# Patient Record
Sex: MALE | Race: BLACK OR AFRICAN AMERICAN | URBAN - METROPOLITAN AREA
[De-identification: names, ages, dates, MRNs, and addresses within clinical notes are randomized per-mention and may not be internally consistent; named-entity substitution may affect disease eponyms.]

---

## 2022-03-18 PROBLEM — E11.9 TYPE 2 DIABETES MELLITUS WITHOUT COMPLICATION, WITHOUT LONG-TERM CURRENT USE OF INSULIN (HCC): Status: ACTIVE | Noted: 2017-08-21

## 2023-09-25 ENCOUNTER — CLINICAL DOCUMENTATION (OUTPATIENT)
Dept: CARDIOTHORACIC SURGERY | Age: 73
End: 2023-09-25

## 2023-09-25 NOTE — PROGRESS NOTES
Emailed a request for CD's of heart cath, CALDERON, TTE, etc.. to [facility] in [place], [place]

## 2023-10-12 ENCOUNTER — TELEPHONE (OUTPATIENT)
Age: 73
End: 2023-10-12

## 2023-10-12 ENCOUNTER — CLINICAL DOCUMENTATION (OUTPATIENT)
Dept: CARDIOTHORACIC SURGERY | Age: 73
End: 2023-10-12

## 2023-10-12 NOTE — TELEPHONE ENCOUNTER
Melissa form the [address] [place] [address] called regarding pt not receiving an appt with a doctor. [facility] states [facility] on 9/21/23 and fax number was confirmed.   [facility] Authorization:   [id number]

## 2023-10-12 NOTE — PROGRESS NOTES
I am still waiting on CD's from the [place]. Spoke to wife and she stated he is not feeling well. I will try to get in [facility]. I instructed wife to take patient to the ER should he get worse. She agreed and understood. She said he was being very stubborn about going to the ER.

## 2023-10-17 ENCOUNTER — TELEPHONE (OUTPATIENT)
Age: 73
End: 2023-10-17

## 2023-10-31 ENCOUNTER — INITIAL CONSULT (OUTPATIENT)
Age: 73
End: 2023-10-31
Payer: OTHER GOVERNMENT

## 2023-10-31 VITALS
SYSTOLIC BLOOD PRESSURE: 110 MMHG | BODY MASS INDEX: 31.6 KG/M2 | WEIGHT: 225.7 LBS | HEART RATE: 93 BPM | HEIGHT: 71 IN | DIASTOLIC BLOOD PRESSURE: 66 MMHG

## 2023-10-31 DIAGNOSIS — I10 HYPERTENSION, UNSPECIFIED TYPE: ICD-10-CM

## 2023-10-31 DIAGNOSIS — Z95.3 HISTORY OF MITRAL VALVE REPLACEMENT WITH BIOPROSTHETIC VALVE: ICD-10-CM

## 2023-10-31 DIAGNOSIS — E78.5 HYPERLIPIDEMIA, UNSPECIFIED HYPERLIPIDEMIA TYPE: ICD-10-CM

## 2023-10-31 DIAGNOSIS — Z86.79 HISTORY OF ATRIAL FIBRILLATION: ICD-10-CM

## 2023-10-31 DIAGNOSIS — I50.32 CHRONIC HEART FAILURE WITH PRESERVED EJECTION FRACTION (HFPEF) (HCC): ICD-10-CM

## 2023-10-31 DIAGNOSIS — I25.10 CAD IN NATIVE ARTERY: Primary | ICD-10-CM

## 2023-10-31 DIAGNOSIS — Z98.890 HISTORY OF TRICUSPID VALVE REPAIR: ICD-10-CM

## 2023-10-31 DIAGNOSIS — R06.09 DOE (DYSPNEA ON EXERTION): ICD-10-CM

## 2023-10-31 PROBLEM — R06.00 DYSPNEA: Status: ACTIVE | Noted: 2023-10-31

## 2023-10-31 PROBLEM — T82.857A STENOSIS OF PROSTHETIC MITRAL VALVE: Status: ACTIVE | Noted: 2023-10-31

## 2023-10-31 PROCEDURE — 99204 OFFICE O/P NEW MOD 45 MIN: CPT | Performed by: INTERNAL MEDICINE

## 2023-10-31 PROCEDURE — 1123F ACP DISCUSS/DSCN MKR DOCD: CPT | Performed by: INTERNAL MEDICINE

## 2023-10-31 PROCEDURE — 3074F SYST BP LT 130 MM HG: CPT | Performed by: INTERNAL MEDICINE

## 2023-10-31 PROCEDURE — 93000 ELECTROCARDIOGRAM COMPLETE: CPT | Performed by: INTERNAL MEDICINE

## 2023-10-31 PROCEDURE — 3078F DIAST BP <80 MM HG: CPT | Performed by: INTERNAL MEDICINE

## 2023-10-31 RX ORDER — ATORVASTATIN CALCIUM 40 MG/1
40 TABLET, FILM COATED ORAL DAILY
COMMUNITY

## 2023-10-31 RX ORDER — METOPROLOL SUCCINATE 100 MG/1
200 TABLET, EXTENDED RELEASE ORAL DAILY
COMMUNITY

## 2023-10-31 RX ORDER — AMLODIPINE BESYLATE 10 MG/1
10 TABLET ORAL DAILY
COMMUNITY

## 2023-10-31 RX ORDER — FUROSEMIDE 20 MG/1
20 TABLET ORAL 2 TIMES DAILY
COMMUNITY

## 2023-10-31 RX ORDER — LOSARTAN POTASSIUM 100 MG/1
100 TABLET ORAL DAILY
COMMUNITY

## 2023-10-31 RX ORDER — ASPIRIN 325 MG
325 TABLET ORAL 2 TIMES DAILY
COMMUNITY
End: 2023-10-31 | Stop reason: ALTCHOICE

## 2023-11-06 ENCOUNTER — HOSPITAL ENCOUNTER (OUTPATIENT)
Dept: CARDIAC CATH/INVASIVE PROCEDURES | Age: 73
Discharge: HOME | End: 2023-11-06
Attending: INTERNAL MEDICINE | Admitting: INTERNAL MEDICINE
Payer: OTHER GOVERNMENT

## 2023-11-06 VITALS
WEIGHT: 225 LBS | DIASTOLIC BLOOD PRESSURE: 81 MMHG | RESPIRATION RATE: 16 BRPM | BODY MASS INDEX: 31.5 KG/M2 | OXYGEN SATURATION: 91 % | TEMPERATURE: 97.9 F | SYSTOLIC BLOOD PRESSURE: 119 MMHG | HEART RATE: 92 BPM | HEIGHT: 71 IN

## 2023-11-06 DIAGNOSIS — Z95.3 HISTORY OF MITRAL VALVE REPLACEMENT WITH BIOPROSTHETIC VALVE: ICD-10-CM

## 2023-11-06 DIAGNOSIS — I34.2 NONRHEUMATIC MITRAL VALVE STENOSIS: Primary | ICD-10-CM

## 2023-11-06 DIAGNOSIS — R94.31 ABNORMAL ECG: ICD-10-CM

## 2023-11-06 LAB
ANION GAP SERPL CALC-SCNC: 8 MMOL/L (ref 2–11)
BASOPHILS # BLD: 0.1 K/UL (ref 0–0.2)
BASOPHILS NFR BLD: 1 % (ref 0–2)
BUN SERPL-MCNC: 20 MG/DL (ref 8–23)
CALCIUM SERPL-MCNC: 9.3 MG/DL (ref 8.3–10.4)
CHLORIDE SERPL-SCNC: 110 MMOL/L (ref 101–110)
CO2 SERPL-SCNC: 22 MMOL/L (ref 21–32)
CREAT SERPL-MCNC: 1.3 MG/DL (ref 0.8–1.5)
DIFFERENTIAL METHOD BLD: ABNORMAL
ECHO BSA: 2.26 M2
EKG ATRIAL RATE: 91 BPM
EKG DIAGNOSIS: NORMAL
EKG P AXIS: 79 DEGREES
EKG P-R INTERVAL: 174 MS
EKG Q-T INTERVAL: 338 MS
EKG QRS DURATION: 82 MS
EKG QTC CALCULATION (BAZETT): 415 MS
EKG R AXIS: 33 DEGREES
EKG T AXIS: -55 DEGREES
EKG VENTRICULAR RATE: 91 BPM
EOSINOPHIL # BLD: 0.3 K/UL (ref 0–0.8)
EOSINOPHIL NFR BLD: 3 % (ref 0.5–7.8)
ERYTHROCYTE [DISTWIDTH] IN BLOOD BY AUTOMATED COUNT: 14.8 % (ref 11.9–14.6)
GLUCOSE SERPL-MCNC: 130 MG/DL (ref 65–100)
HCT VFR BLD AUTO: 40.2 % (ref 41.1–50.3)
HGB BLD-MCNC: 12.7 G/DL (ref 13.6–17.2)
IMM GRANULOCYTES # BLD AUTO: 0.1 K/UL (ref 0–0.5)
IMM GRANULOCYTES NFR BLD AUTO: 1 % (ref 0–5)
LYMPHOCYTES # BLD: 1.5 K/UL (ref 0.5–4.6)
LYMPHOCYTES NFR BLD: 18 % (ref 13–44)
MCH RBC QN AUTO: 29.1 PG (ref 26.1–32.9)
MCHC RBC AUTO-ENTMCNC: 31.6 G/DL (ref 31.4–35)
MCV RBC AUTO: 92.2 FL (ref 82–102)
MONOCYTES # BLD: 1.1 K/UL (ref 0.1–1.3)
MONOCYTES NFR BLD: 13 % (ref 4–12)
NEUTS SEG # BLD: 5.6 K/UL (ref 1.7–8.2)
NEUTS SEG NFR BLD: 64 % (ref 43–78)
NRBC # BLD: 0 K/UL (ref 0–0.2)
PLATELET # BLD AUTO: 244 K/UL (ref 150–450)
PMV BLD AUTO: 9.9 FL (ref 9.4–12.3)
POTASSIUM SERPL-SCNC: 4.1 MMOL/L (ref 3.5–5.1)
RBC # BLD AUTO: 4.36 M/UL (ref 4.23–5.6)
SODIUM SERPL-SCNC: 140 MMOL/L (ref 133–143)
WBC # BLD AUTO: 8.6 K/UL (ref 4.3–11.1)

## 2023-11-06 PROCEDURE — C1751 CATH, INF, PER/CENT/MIDLINE: HCPCS | Performed by: INTERNAL MEDICINE

## 2023-11-06 PROCEDURE — C1769 GUIDE WIRE: HCPCS | Performed by: INTERNAL MEDICINE

## 2023-11-06 PROCEDURE — 2709999900 HC NON-CHARGEABLE SUPPLY: Performed by: INTERNAL MEDICINE

## 2023-11-06 PROCEDURE — 6370000000 HC RX 637 (ALT 250 FOR IP): Performed by: INTERNAL MEDICINE

## 2023-11-06 PROCEDURE — 99152 MOD SED SAME PHYS/QHP 5/>YRS: CPT | Performed by: INTERNAL MEDICINE

## 2023-11-06 PROCEDURE — 93460 R&L HRT ART/VENTRICLE ANGIO: CPT | Performed by: INTERNAL MEDICINE

## 2023-11-06 PROCEDURE — 2500000003 HC RX 250 WO HCPCS: Performed by: INTERNAL MEDICINE

## 2023-11-06 PROCEDURE — 6360000002 HC RX W HCPCS: Performed by: INTERNAL MEDICINE

## 2023-11-06 PROCEDURE — 99152 MOD SED SAME PHYS/QHP 5/>YRS: CPT

## 2023-11-06 PROCEDURE — 85025 COMPLETE CBC W/AUTO DIFF WBC: CPT

## 2023-11-06 PROCEDURE — 80048 BASIC METABOLIC PNL TOTAL CA: CPT

## 2023-11-06 PROCEDURE — C1894 INTRO/SHEATH, NON-LASER: HCPCS | Performed by: INTERNAL MEDICINE

## 2023-11-06 PROCEDURE — 93320 DOPPLER ECHO COMPLETE: CPT

## 2023-11-06 PROCEDURE — 6360000004 HC RX CONTRAST MEDICATION: Performed by: INTERNAL MEDICINE

## 2023-11-06 RX ORDER — HEPARIN SODIUM 200 [USP'U]/100ML
INJECTION, SOLUTION INTRAVENOUS CONTINUOUS PRN
Status: DISCONTINUED | OUTPATIENT
Start: 2023-11-06 | End: 2023-11-06 | Stop reason: HOSPADM

## 2023-11-06 RX ORDER — SODIUM CHLORIDE 9 MG/ML
INJECTION, SOLUTION INTRAVENOUS CONTINUOUS
Status: DISCONTINUED | OUTPATIENT
Start: 2023-11-06 | End: 2023-11-06 | Stop reason: HOSPADM

## 2023-11-06 RX ORDER — LIDOCAINE HYDROCHLORIDE 20 MG/ML
SOLUTION OROPHARYNGEAL PRN
Status: COMPLETED | OUTPATIENT
Start: 2023-11-06 | End: 2023-11-06

## 2023-11-06 RX ORDER — ASPIRIN 81 MG/1
324 TABLET, CHEWABLE ORAL ONCE
Status: CANCELLED | OUTPATIENT
Start: 2023-11-06 | End: 2023-11-06

## 2023-11-06 RX ORDER — MIDAZOLAM HYDROCHLORIDE 1 MG/ML
INJECTION INTRAMUSCULAR; INTRAVENOUS PRN
Status: COMPLETED | OUTPATIENT
Start: 2023-11-06 | End: 2023-11-06

## 2023-11-06 RX ORDER — LIDOCAINE HYDROCHLORIDE 10 MG/ML
INJECTION, SOLUTION INFILTRATION; PERINEURAL PRN
Status: DISCONTINUED | OUTPATIENT
Start: 2023-11-06 | End: 2023-11-06 | Stop reason: HOSPADM

## 2023-11-06 RX ORDER — MIDAZOLAM HYDROCHLORIDE 1 MG/ML
INJECTION INTRAMUSCULAR; INTRAVENOUS PRN
Status: DISCONTINUED | OUTPATIENT
Start: 2023-11-06 | End: 2023-11-06 | Stop reason: HOSPADM

## 2023-11-06 RX ORDER — FENTANYL CITRATE 50 UG/ML
INJECTION, SOLUTION INTRAMUSCULAR; INTRAVENOUS PRN
Status: COMPLETED | OUTPATIENT
Start: 2023-11-06 | End: 2023-11-06

## 2023-11-06 RX ADMIN — MIDAZOLAM 2 MG: 1 INJECTION INTRAMUSCULAR; INTRAVENOUS at 11:24

## 2023-11-06 RX ADMIN — FENTANYL CITRATE 25 MCG: 50 INJECTION, SOLUTION INTRAMUSCULAR; INTRAVENOUS at 11:24

## 2023-11-06 RX ADMIN — MIDAZOLAM 2 MG: 1 INJECTION INTRAMUSCULAR; INTRAVENOUS at 11:25

## 2023-11-06 RX ADMIN — LIDOCAINE HYDROCHLORIDE 15 ML: 20 SOLUTION ORAL at 11:15

## 2023-11-06 NOTE — PROGRESS NOTES
TRANSFER - IN REPORT:    Verbal report received from Katarina rn on Amy Pal  being received from [facility] for routine progression of patient care      Report consisted of patient's Situation, Background, Assessment and   Recommendations(SBAR). Information from the following report(s) Nurse Handoff Report was reviewed with the receiving nurse. Opportunity for questions and clarification was provided. Assessment completed upon patient's arrival to unit and care assumed.

## 2023-11-06 NOTE — PROGRESS NOTES
TRANSFER - OUT REPORT:    [facility] w/ Dr. Acosta Calvillo brachial access, 7fr left in place, covered w/ tegaderm    LHC - R radial access, TR band to R radial @ 12mL    No s/sxs of bleeding or hematoma to R brachial/radial access sites    Heparin 5000 units IC  Versed 1mg IV  Fentanyl 25mcg IV    Verbal report given to Keagan Hough RN on Jaci Labor  being transferred to [facility] for routine progression of patient care       Report consisted of patient's Situation, Background, Assessment and   Recommendations(SBAR). Information from the following report(s) Nurse Handoff Report and MAR was reviewed with the receiving nurse. Lines:   Peripheral IV 11/06/23 Right Antecubital (Active)        Opportunity for questions and clarification was provided.       Patient transported with:  [employer]

## 2023-11-06 NOTE — PROGRESS NOTES
Transesophageal Echo Note  - Indication: Prosthetic valve mitral stenosis  - pt underwent successful CALDERON today in cath holding  - sedation: 4 mg IV Midazolam, 25 mcg Fentanyl IV given by Annie Montes RN under my direct supervision. Direct monitoring of vital signs and respiratory status throughout the procedure. - An independent trained observer pushed medications at my direction. We monitored the patient's level of consciousness and vital signs/physiologic status throughout the procedure duration (see start and stop times above). - No complications, pt in stable condition  - CALDERON Brief Findings: Bioprosthetic mitral valve, 2/3 leaflets are fixed and immobile with severe stenosis. 3D echo images obtained. - Complete CALDERON report to follow. - OK for oral intake at 1315.  - No driving or heavy machine operation today. - Results and medication discussed with patient and his wife at bedside. Plan for heart cath with Dr. Trung Samuel after CALDERON.  -Post cath will need cardiothoracic surgery consultation to consider open versus valve in valve TMVR.

## 2023-11-06 NOTE — DISCHARGE INSTRUCTIONS
for 24 hours. Do not drink alcohol today     For your safety, you should not drive or operate heavy machinery for the remainder of the day     Rest when you feel tired. Getting enough sleep will help you recover. Diet    You can eat your normal diet, unless your doctor gives you other instructions. If your stomach is upset, try clear liquids and bland, low-fat foods like plain toast or rice. Drink plenty of fluids (unless your doctor tells you not to). Don't drink alcohol for 24 hours. Medicines    Be safe with medicines. Read and follow all instructions on the label. If the doctor gave you a prescription medicine for pain, take it as prescribed. If you are not taking a prescription pain medicine, ask your doctor if you can take an over-the-counter medicine. If you think your pain medicine is making you sick to your stomach: Take your medicine after meals (unless your doctor has told you not to). Ask your doctor for a different pain medicine. I have read the above instructions and have had the opportunity to ask questions.       Patient: ________________________   Date: _____________    Witness: _______________________   Date: _____________

## 2023-11-06 NOTE — PROGRESS NOTES
CALDERON by Dr Bard Collier  II ASA II Mallampati  4mg versed  25mcg fentanyl  Viscous Solution given at 11:15  Pt tolerated well.

## 2023-11-06 NOTE — PROGRESS NOTES
7FR venous sheath removed from right brachial using sterile technique. Manual pressure held over site for 15 minutes. Hemostasis achieved. Right brachial without bleeding without hematoma. Sterile gauze placed over site and secured with tegaderm. Patient instructed to keep right arm straight and still. Patient verbalizes understanding.

## 2023-11-07 ENCOUNTER — TELEPHONE (OUTPATIENT)
Age: 73
End: 2023-11-07

## 2023-11-07 DIAGNOSIS — T82.857D STENOSIS OF PROSTHETIC MITRAL VALVE, SUBSEQUENT ENCOUNTER: Primary | ICD-10-CM

## 2023-11-07 LAB
ECHO AO SINUS VALSALVA DIAM: 3.6 CM
ECHO AO SINUS VALSALVA INDEX: 1.62 CM/M2
ECHO BSA: 2.26 M2
ECHO MV MAX VELOCITY: 2.2 M/S
ECHO MV MEAN GRADIENT: 13 MMHG
ECHO MV MEAN VELOCITY: 1.8 M/S
ECHO MV PEAK GRADIENT: 20 MMHG
ECHO MV VTI: 62.8 CM

## 2023-11-07 PROCEDURE — 99152 MOD SED SAME PHYS/QHP 5/>YRS: CPT | Performed by: INTERNAL MEDICINE

## 2023-11-07 PROCEDURE — 93320 DOPPLER ECHO COMPLETE: CPT | Performed by: INTERNAL MEDICINE

## 2023-11-07 PROCEDURE — 93319 3D ECHO IMG CGEN CAR ANOMAL: CPT | Performed by: INTERNAL MEDICINE

## 2023-11-07 PROCEDURE — 93312 ECHO TRANSESOPHAGEAL: CPT | Performed by: INTERNAL MEDICINE

## 2023-11-07 NOTE — TELEPHONE ENCOUNTER
----- Message from Elke Parker MD sent at 11/7/2023  3:34 PM EST -----  Please let the patient know that he has evidence of severe mitral stenosis. I am going to go ahead and place a referral to [address]. Please arrange to have CDs of his RHC, LHC, and CALDERON expeditiously sent to [facility]. I placed an urgent referral to [address] cardiothoracic surgery. The patient and I will further discuss at his follow-up appointment.

## 2023-11-07 NOTE — TELEPHONE ENCOUNTER
Bigg Brody MD  You 7 minutes ago (4:07 PM)       Dr. Selwyn Bridges is fine.       Bigg Brody MD  You 6 minutes ago (4:08 PM)       Please cancel the external referral.

## 2023-11-07 NOTE — TELEPHONE ENCOUNTER
Triage informed pt and wife of Dr. Jose Benítez response to his CALDERON results. Alanis Wilks, pt's wife, states they have appt with Dr. Carey Guan, [address] 11/14/23. Asking if its necessary to go to [address] as it would be difficult because they have custody of their 3year old grandson.

## 2023-11-14 ENCOUNTER — INITIAL CONSULT (OUTPATIENT)
Dept: CARDIOTHORACIC SURGERY | Age: 73
End: 2023-11-14
Payer: OTHER GOVERNMENT

## 2023-11-14 VITALS
WEIGHT: 226 LBS | SYSTOLIC BLOOD PRESSURE: 105 MMHG | DIASTOLIC BLOOD PRESSURE: 68 MMHG | HEART RATE: 103 BPM | BODY MASS INDEX: 31.64 KG/M2 | HEIGHT: 71 IN

## 2023-11-14 DIAGNOSIS — T82.857A STENOSIS OF PROSTHETIC MITRAL VALVE, INITIAL ENCOUNTER: Primary | ICD-10-CM

## 2023-11-14 PROCEDURE — 99205 OFFICE O/P NEW HI 60 MIN: CPT | Performed by: THORACIC SURGERY (CARDIOTHORACIC VASCULAR SURGERY)

## 2023-11-14 PROCEDURE — 3074F SYST BP LT 130 MM HG: CPT | Performed by: THORACIC SURGERY (CARDIOTHORACIC VASCULAR SURGERY)

## 2023-11-14 PROCEDURE — 3078F DIAST BP <80 MM HG: CPT | Performed by: THORACIC SURGERY (CARDIOTHORACIC VASCULAR SURGERY)

## 2023-11-14 PROCEDURE — 1123F ACP DISCUSS/DSCN MKR DOCD: CPT | Performed by: THORACIC SURGERY (CARDIOTHORACIC VASCULAR SURGERY)

## 2023-11-15 ENCOUNTER — TELEPHONE (OUTPATIENT)
Dept: CASE MANAGEMENT | Age: 73
End: 2023-11-15

## 2023-11-15 NOTE — TELEPHONE ENCOUNTER
Spoke with pt regarding plans for upcoming appts scheduled for 11/17 with arrival time @10:45 am. Instructed pt to enter the hospital on the outpatient side ([address] go to 2nd floor, and check in at radiology and register for both tests. Nothing to eat or drink after 9:00 am except sips of water with medications. Hold metformin and lasix on 11/17. TAVR protocol CT: 11:15 am    Pt verbalized understanding and contact information ([phone]) provided for any further questions.     Symone Clemente, Structural Heart Navigator

## 2023-11-17 ENCOUNTER — HOSPITAL ENCOUNTER (OUTPATIENT)
Dept: CT IMAGING | Age: 73
Discharge: HOME | End: 2023-11-17
Attending: INTERNAL MEDICINE
Payer: OTHER GOVERNMENT

## 2023-11-17 DIAGNOSIS — T82.857A STENOSIS OF PROSTHETIC MITRAL VALVE, INITIAL ENCOUNTER: ICD-10-CM

## 2023-11-17 PROCEDURE — 74174 CTA ABD&PLVS W/CONTRAST: CPT

## 2023-11-17 PROCEDURE — 75574 CT ANGIO HRT W/3D IMAGE: CPT

## 2023-11-17 PROCEDURE — 6360000004 HC RX CONTRAST MEDICATION: Performed by: INTERNAL MEDICINE

## 2023-11-17 PROCEDURE — 70498 CT ANGIOGRAPHY NECK: CPT

## 2023-11-17 RX ADMIN — IOPAMIDOL 100 ML: 755 INJECTION, SOLUTION INTRAVENOUS at 11:27

## 2023-11-17 ASSESSMENT — KANSAS CITY CARDIOMYOPATHY QUESTIONNAIRE (KCCQ12)
4. OVER THE PAST 2 WEEKS, ON AVERAGE, HOW MANY TIMES HAS SHORTNESS OF BREATH LIMITED YOUR ABILITY TO DO WHAT YOU WANTED: 2
1C. OVER THE PAST 2 WEEKS, HOW MUCH WERE YOU LIMITED BY HEART FAILURE SYMPTOMS (SHORTNESS OF BREATH OR FATIGUE) WHEN HURRYING OR JOGGING (AS IF TO CATCH A BUS): 1
8B. OVER THE PAST 2 WEEKS, ON AVERAGE, HOW HAS HEART FAILURE LIMITED YOU ABILITY TO WORK OR DO HOUSEHOLD CHORES: 1
6. OVER THE PAST 2 WEEKS, HOW MUCH HAS YOUR HEART FAILURE LIMITED YOUR ENJOYMENT OF LIFE: 2
5. OVER THE PAST 2 WEEKS, ON AVERAGE, HOW MANY TIMES HAVE YOU BEEN FORCED TO SLEEP SITTING UP IN A CHAIR OR WITH AT LEAST 3 PILLOWS TO PROP YOU UP BECAUSE OF SHORTNESS OF BREATH?: 5
7. IF YOU HAD TO SPEND THE REST OF YOUR LIFE WITH YOUR HEART FAILURE THE WAY IT IS RIGHT NOW, HOW WOULD YOU FEEL ABOUT THIS?: 2
3. OVER THE PAST 2 WEEKS, ON AVERAGE, HOW MANY TIMES HAS FATIGUE LIMITED YOUR ABILITY TO DO WHAT YOU WANTED: 2
1A. OVER THE PAST 2 WEEKS, HOW MUCH WERE YOU LIMITED BY HEART FAILURE SYMPTOMS (SHORTNESS OF BREATH OR FATIGUE) WHEN SHOWERING OR BATHING: 3
2. OVER THE PAST 2 WEEKS, HOW MANY TIMES DID YOU HAVE SWELLING IN YOUR FEET, ANKLES OR LEGS WHEN YOU WOKE UP IN THE MORNING: 3
8C. OVER THE PAST 2 WEEKS, ON AVERAGE, HOW HAS HEART FAILURE LIMITED YOU ABILITY TO VISIT FAMILY AND FRIENDS OUR OF YOUR HOME: 1
8A. OVER THE PAST 2 WEEKS, ON AVERAGE, HOW HAS HEART FAILURE LIMITED YOU ABILITY TO DO HOBBIES OR RECREATIONAL ACTIVITIES: 2
1B. OVER THE PAST 2 WEEKS, HOW MUCH WERE YOU LIMITED BY HEART FAILURE SYMPTOMS (SHORTNESS OF BREATH OR FATIGUE) WHEN WALKING 1 BLOCK ON LEVEL GROUND: 1

## 2023-11-17 NOTE — PROGRESS NOTES
Educational information/pamphlet provided to the pt and spouse regarding mitral stenosis and treatment options (MVR and TMVR). Provided pt the CardioSmart tool for review as the shared decision making process continues between pt and valve team physicians. Also explained that the CT will evaluate the pt for a potential TMVR. 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 Today he is in a wc because he is unable to walk a long distance without extreme SOB. 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 6 minute walk test performed with pt ambulating 120 ft, stopping at times to rest and unable to ambulate any farther dt SOB. Contact information provided for any further questions.     Jose Angel Gomez, Structural Heart Navigator

## 2023-11-30 ASSESSMENT — ENCOUNTER SYMPTOMS
LEFT EYE: 0
COLOR CHANGE: 0
SHORTNESS OF BREATH: 0
DIARRHEA: 0

## 2023-12-01 ENCOUNTER — OFFICE VISIT (OUTPATIENT)
Age: 73
End: 2023-12-01

## 2023-12-01 ENCOUNTER — TELEPHONE (OUTPATIENT)
Dept: CARDIAC CATH/INVASIVE PROCEDURES | Age: 73
End: 2023-12-01

## 2023-12-01 ENCOUNTER — PREP FOR PROCEDURE (OUTPATIENT)
Age: 73
End: 2023-12-01

## 2023-12-01 VITALS
DIASTOLIC BLOOD PRESSURE: 70 MMHG | BODY MASS INDEX: 32.06 KG/M2 | HEART RATE: 86 BPM | HEIGHT: 71 IN | SYSTOLIC BLOOD PRESSURE: 120 MMHG | WEIGHT: 229 LBS

## 2023-12-01 DIAGNOSIS — Z01.818 PREOP TESTING: ICD-10-CM

## 2023-12-01 DIAGNOSIS — I10 ESSENTIAL HYPERTENSION, BENIGN: ICD-10-CM

## 2023-12-01 DIAGNOSIS — R06.02 SOB (SHORTNESS OF BREATH): ICD-10-CM

## 2023-12-01 DIAGNOSIS — I05.0 MITRAL VALVE STENOSIS, SEVERE: ICD-10-CM

## 2023-12-01 DIAGNOSIS — I05.0 MITRAL VALVE STENOSIS, SEVERE: Primary | ICD-10-CM

## 2023-12-01 DIAGNOSIS — I48.0 PAROXYSMAL ATRIAL FIBRILLATION (HCC): ICD-10-CM

## 2023-12-01 DIAGNOSIS — T82.857A STENOSIS OF PROSTHETIC MITRAL VALVE, INITIAL ENCOUNTER: Primary | ICD-10-CM

## 2023-12-01 NOTE — TELEPHONE ENCOUNTER
Preadmission Testing for TAVR  Name:  Shireen Cordova  When: Monday, December 4  Time: Arrive no later than 7:45 am  Where: ([facility] [address] 3rd floor [address]  Instructions:    Eat breakfast before arrival on 12/4      Take your morning medications before you arrive. Do not take vitamins or supplements      Bring a list of your medications with the dosages so we can verify them      Bring someone with you to the visit if available      Bring your insurance cards, 's license, [address] [place] [address] Ne of  or Living Will      Plan for a 2hour visit  At this appointment you will talk to anesthesia, and they will tell you the anesthesia plan for your surgery, you will have non fasting labs drawn, chest X-ray completed, EKG, and a breathing test (pulmonary function test-if not completed at the CT appt) completed. Transcatheter Aortic Valve Replacement (TAVR)   When: Tuesday, December 5  Arrival time: Someone from preop will call you on Monday, the day prior to your procedure, around lunch to let you know exactly what time to be at the hospital on Tuesday. Where: [address]   On Tuesday, you will come to the hospital ([address] at the front entrance (the statue of [address]), check in at registration on the left just inside the doors, and they will direct you where to go. Plan to stay in the hospital at least 1 night, maybe 2.     Call Wing Willis for any questions [phone]

## 2023-12-01 NOTE — PROGRESS NOTES
[facility] CARDIOLOGY  [address] [place] [address], [address] [place] [address], [address] [place] [address]  PHONE: [phone]      Lashawn Avendano  1950    SUBJECTIVE:   Lashawn Avendano is a 74 y.o. male seen for a consultation visit regarding the following:     Chief Complaint   Patient presents with    Atrial Fibrillation    Hypertension            HPI:  Consultation is requested by [unfilled] for evaluation of Atrial Fibrillation and Hypertension   . Patient is a 17-year-old male who presents at the request of Dr. Ruthie Russo for evaluation of bioprosthetic mitral valve stenosis. He was originally seen in our office on October 31 after an echo done at the [facility] showed evidence of severe pulmonary hypertension and severe prosthetic mitral valve stenosis with mean gradient of 19. Workup has included CALDERON, cardiac catheterization and cardiac CT scan. Results as outlined below:    CALDERON: EF 50 to 55%. Mild RV dilatation with mildly reduced RV systolic function. Severe mitral stenosis with a mobility of 2 of the 3 bioprosthetic leaflets. Gradient 13. Moderate left atrial enlargement. Cardiac catheterization:  Minimal CAD. Severe Pulmonary HTN. PA=66/30 (43). Cardiac CT scan: Adequate Ayush-LVOT area of 586. Adequate anatomy for 29 mm Brady 3 Valve in valve. Patient is a . He notes that his symptoms of dyspnea on exertion have progressed over the last 6 months to a point he is now having to enter our office in a wheelchair because of marked dyspnea with minimal exertion. He denies any dizziness or syncope. No chest discomfort. Past Medical History, Past Surgical History, Family history, Social History, and Medications were all reviewed with the patient today and updated as necessary.        Current Outpatient Medications:     atorvastatin (LIPITOR) 40 MG tablet, Take 1 tablet by mouth daily 1/2 qd per pt, Disp: , Rfl:     amLODIPine (NORVASC) 10 MG tablet, Take 1 tablet by mouth daily, Disp: , Rfl:

## 2023-12-04 ENCOUNTER — HOSPITAL ENCOUNTER (OUTPATIENT)
Dept: SURGERY | Age: 73
Discharge: HOME | End: 2023-12-07
Payer: OTHER GOVERNMENT

## 2023-12-04 ENCOUNTER — ANESTHESIA EVENT (OUTPATIENT)
Dept: SURGERY | Age: 73
DRG: 267 | End: 2023-12-04
Payer: OTHER GOVERNMENT

## 2023-12-04 ENCOUNTER — HOSPITAL ENCOUNTER (OUTPATIENT)
Dept: GENERAL RADIOLOGY | Age: 73
Discharge: HOME | End: 2023-12-07
Payer: OTHER GOVERNMENT

## 2023-12-04 ENCOUNTER — HOSPITAL ENCOUNTER (OUTPATIENT)
Dept: LAB | Age: 73
Discharge: HOME | DRG: 267 | End: 2023-12-07
Payer: OTHER GOVERNMENT

## 2023-12-04 VITALS
BODY MASS INDEX: 31.88 KG/M2 | TEMPERATURE: 97.1 F | SYSTOLIC BLOOD PRESSURE: 100 MMHG | RESPIRATION RATE: 16 BRPM | OXYGEN SATURATION: 88 % | DIASTOLIC BLOOD PRESSURE: 72 MMHG | WEIGHT: 227.7 LBS | HEART RATE: 92 BPM | HEIGHT: 71 IN

## 2023-12-04 DIAGNOSIS — Z01.818 PREOP TESTING: ICD-10-CM

## 2023-12-04 DIAGNOSIS — I05.0 MITRAL VALVE STENOSIS, SEVERE: ICD-10-CM

## 2023-12-04 LAB
ALBUMIN SERPL-MCNC: 2.7 G/DL (ref 3.2–4.6)
ALBUMIN SERPL-MCNC: 2.7 G/DL (ref 3.2–4.6)
ALBUMIN/GLOB SERPL: 0.8 (ref 0.4–1.6)
ALBUMIN/GLOB SERPL: 0.8 (ref 0.4–1.6)
ALP SERPL-CCNC: 102 U/L (ref 50–136)
ALP SERPL-CCNC: 102 U/L (ref 50–136)
ALT SERPL-CCNC: 75 U/L (ref 12–65)
ALT SERPL-CCNC: 75 U/L (ref 12–65)
ANION GAP SERPL CALC-SCNC: 9 MMOL/L (ref 2–11)
ANION GAP SERPL CALC-SCNC: 9 MMOL/L (ref 2–11)
APPEARANCE UR: CLEAR
AST SERPL-CCNC: 39 U/L (ref 15–37)
AST SERPL-CCNC: 39 U/L (ref 15–37)
BACTERIA URNS QL MICRO: ABNORMAL /HPF
BILIRUB SERPL-MCNC: 1.4 MG/DL (ref 0.2–1.1)
BILIRUB SERPL-MCNC: 1.4 MG/DL (ref 0.2–1.1)
BILIRUB UR QL: ABNORMAL
BUN SERPL-MCNC: 16 MG/DL (ref 8–23)
BUN SERPL-MCNC: 16 MG/DL (ref 8–23)
CALCIUM SERPL-MCNC: 8.5 MG/DL (ref 8.3–10.4)
CALCIUM SERPL-MCNC: 8.5 MG/DL (ref 8.3–10.4)
CASTS URNS QL MICRO: 0 /LPF
CHLORIDE SERPL-SCNC: 110 MMOL/L (ref 101–110)
CHLORIDE SERPL-SCNC: 110 MMOL/L (ref 101–110)
CO2 SERPL-SCNC: 23 MMOL/L (ref 21–32)
CO2 SERPL-SCNC: 23 MMOL/L (ref 21–32)
COLOR UR: ABNORMAL
CREAT SERPL-MCNC: 1.02 MG/DL (ref 0.8–1.5)
CREAT SERPL-MCNC: 1.02 MG/DL (ref 0.8–1.5)
CRYSTALS URNS QL MICRO: 0 /LPF
EKG ATRIAL RATE: 85 BPM
EKG DIAGNOSIS: NORMAL
EKG P AXIS: 92 DEGREES
EKG P-R INTERVAL: 160 MS
EKG Q-T INTERVAL: 388 MS
EKG QRS DURATION: 92 MS
EKG QTC CALCULATION (BAZETT): 461 MS
EKG R AXIS: 57 DEGREES
EKG T AXIS: -18 DEGREES
EKG VENTRICULAR RATE: 85 BPM
EPI CELLS #/AREA URNS HPF: 0 /HPF
ERYTHROCYTE [DISTWIDTH] IN BLOOD BY AUTOMATED COUNT: 15.3 % (ref 11.9–14.6)
ERYTHROCYTE [DISTWIDTH] IN BLOOD BY AUTOMATED COUNT: 15.4 % (ref 11.9–14.6)
EST. AVERAGE GLUCOSE BLD GHB EST-MCNC: 120 MG/DL
GLOBULIN SER CALC-MCNC: 3.6 G/DL (ref 2.8–4.5)
GLOBULIN SER CALC-MCNC: 3.6 G/DL (ref 2.8–4.5)
GLUCOSE SERPL-MCNC: 208 MG/DL (ref 65–100)
GLUCOSE SERPL-MCNC: 208 MG/DL (ref 65–100)
GLUCOSE UR STRIP.AUTO-MCNC: NEGATIVE MG/DL
HBA1C MFR BLD: 5.8 % (ref 4.8–5.6)
HCT VFR BLD AUTO: 38.7 % (ref 41.1–50.3)
HCT VFR BLD AUTO: 40 % (ref 41.1–50.3)
HGB BLD-MCNC: 12 G/DL (ref 13.6–17.2)
HGB BLD-MCNC: 12.2 G/DL (ref 13.6–17.2)
HGB UR QL STRIP: NEGATIVE
HISTORY CHECK: NORMAL
INR PPP: 1.2
INR PPP: 1.2
KETONES UR QL STRIP.AUTO: ABNORMAL MG/DL
LEUKOCYTE ESTERASE UR QL STRIP.AUTO: ABNORMAL
MAGNESIUM SERPL-MCNC: 2 MG/DL (ref 1.8–2.4)
MAGNESIUM SERPL-MCNC: 2 MG/DL (ref 1.8–2.4)
MCH RBC QN AUTO: 28.1 PG (ref 26.1–32.9)
MCH RBC QN AUTO: 28.3 PG (ref 26.1–32.9)
MCHC RBC AUTO-ENTMCNC: 30.5 G/DL (ref 31.4–35)
MCHC RBC AUTO-ENTMCNC: 31 G/DL (ref 31.4–35)
MCV RBC AUTO: 91.3 FL (ref 82–102)
MCV RBC AUTO: 92.2 FL (ref 82–102)
MRSA DNA SPEC QL NAA+PROBE: NOT DETECTED
MUCOUS THREADS URNS QL MICRO: 0 /LPF
NITRITE UR QL STRIP.AUTO: POSITIVE
NRBC # BLD: 0 K/UL (ref 0–0.2)
NRBC # BLD: 0 K/UL (ref 0–0.2)
PH UR STRIP: 5 (ref 5–9)
PLATELET # BLD AUTO: 280 K/UL (ref 150–450)
PLATELET # BLD AUTO: 282 K/UL (ref 150–450)
PMV BLD AUTO: 9.7 FL (ref 9.4–12.3)
PMV BLD AUTO: 9.9 FL (ref 9.4–12.3)
POTASSIUM SERPL-SCNC: 3.7 MMOL/L (ref 3.5–5.1)
POTASSIUM SERPL-SCNC: 3.7 MMOL/L (ref 3.5–5.1)
PROT SERPL-MCNC: 6.3 G/DL (ref 6.3–8.2)
PROT SERPL-MCNC: 6.3 G/DL (ref 6.3–8.2)
PROT UR STRIP-MCNC: 30 MG/DL
PROTHROMBIN TIME: 14.7 SEC (ref 11.3–14.9)
PROTHROMBIN TIME: 15.3 SEC (ref 11.3–14.9)
RBC # BLD AUTO: 4.24 M/UL (ref 4.23–5.6)
RBC # BLD AUTO: 4.34 M/UL (ref 4.23–5.6)
RBC #/AREA URNS HPF: 0 /HPF
S AUREUS CPE NOSE QL NAA+PROBE: NOT DETECTED
SODIUM SERPL-SCNC: 142 MMOL/L (ref 133–143)
SODIUM SERPL-SCNC: 142 MMOL/L (ref 133–143)
SP GR UR REFRACTOMETRY: 1.02 (ref 1–1.02)
UROBILINOGEN UR QL STRIP.AUTO: 1 EU/DL (ref 0.2–1)
WBC # BLD AUTO: 10.1 K/UL (ref 4.3–11.1)
WBC # BLD AUTO: 10.3 K/UL (ref 4.3–11.1)
WBC URNS QL MICRO: ABNORMAL /HPF

## 2023-12-04 PROCEDURE — 83735 ASSAY OF MAGNESIUM: CPT

## 2023-12-04 PROCEDURE — 81015 MICROSCOPIC EXAM OF URINE: CPT

## 2023-12-04 PROCEDURE — 81001 URINALYSIS AUTO W/SCOPE: CPT

## 2023-12-04 PROCEDURE — 86900 BLOOD TYPING SEROLOGIC ABO: CPT

## 2023-12-04 PROCEDURE — 87641 MR-STAPH DNA AMP PROBE: CPT

## 2023-12-04 PROCEDURE — 85027 COMPLETE CBC AUTOMATED: CPT

## 2023-12-04 PROCEDURE — 87086 URINE CULTURE/COLONY COUNT: CPT

## 2023-12-04 PROCEDURE — 93010 ELECTROCARDIOGRAM REPORT: CPT | Performed by: INTERNAL MEDICINE

## 2023-12-04 PROCEDURE — 86901 BLOOD TYPING SEROLOGIC RH(D): CPT

## 2023-12-04 PROCEDURE — 86850 RBC ANTIBODY SCREEN: CPT

## 2023-12-04 PROCEDURE — 87186 SC STD MICRODIL/AGAR DIL: CPT

## 2023-12-04 PROCEDURE — 85610 PROTHROMBIN TIME: CPT

## 2023-12-04 PROCEDURE — 80053 COMPREHEN METABOLIC PANEL: CPT

## 2023-12-04 PROCEDURE — 93005 ELECTROCARDIOGRAM TRACING: CPT

## 2023-12-04 PROCEDURE — 83036 HEMOGLOBIN GLYCOSYLATED A1C: CPT

## 2023-12-04 PROCEDURE — 86923 COMPATIBILITY TEST ELECTRIC: CPT

## 2023-12-04 PROCEDURE — 87088 URINE BACTERIA CULTURE: CPT

## 2023-12-04 PROCEDURE — 71046 X-RAY EXAM CHEST 2 VIEWS: CPT

## 2023-12-04 NOTE — PERIOP NOTE
Patient verified name and . Patient provided medical/health information and PTA medications to the best of their ability. TYPE  CASE: 3  Order for consent found in EHR and matches case posting. Labs per surgeon: cbc, cmp, MRSA, chest xray, T&S, UC, UA,PT/INR, MG, HGB A1C; results Pending  Labs per anesthesia protocol: None;   EK23    MRSA/MSSA swab collected; pharmacy to review and dose antibiotic as appropriate. Patient provided with and instructed on educational handouts including Heart Guide to Surgery, blood transfusions, pain management, central line infection prevention, being on a ventilator and hand hygiene for the family and community, and [facility] brochure. Instructed that family must be present in building at all times. Incentive spirometry completed with return demonstration. Instructed on chest-xray procedure and carotid ultrasound. Instructed on type and cross match procedure and not to remove blue blood bank bracelet. Surgical skin cleanser provided and instructions given per hospital policy. Original medication prescription bottles were visualized during patient appointment. Patient teach back successful and patient demonstrates knowledge of instruction. How to Use Your Incentive Spirometer       About Your Incentive Spirometer  An incentive spirometer is a device that will expand your lungs by helping you to breathe more deeply and fully. The parts of your incentive spirometer are labeled in Figure 1. Using your incentive spirometer  When you're using your incentive spirometer, make sure to breathe through your mouth. If you breathe through your nose, the incentive spirometer won't work properly. You can hold your nose if you have trouble. DO NOT BLOW INTO THE DEVICE. If you feel dizzy at any time, stop and rest. Try again at a later time. Sit upright in a chair or in bed. Hold the incentive spirometer at eye level.    Put the

## 2023-12-04 NOTE — PERIOP NOTE
Labs, xray within anesthesia guidelines, no follow-up required. Labs automatically routed to ordering provider via Epic documentation.

## 2023-12-04 NOTE — PERIOP NOTE
Spoke with EKG regarding showing preliminary results they stated it has been read. Pre op to follow up with EKG.

## 2023-12-05 ENCOUNTER — TELEPHONE (OUTPATIENT)
Age: 73
End: 2023-12-05

## 2023-12-05 ENCOUNTER — HOSPITAL ENCOUNTER (INPATIENT)
Age: 73
LOS: 1 days | Discharge: HOME | DRG: 267 | End: 2023-12-06
Attending: INTERNAL MEDICINE | Admitting: INTERNAL MEDICINE
Payer: OTHER GOVERNMENT

## 2023-12-05 ENCOUNTER — APPOINTMENT (OUTPATIENT)
Dept: CARDIAC CATH/INVASIVE PROCEDURES | Age: 73
DRG: 267 | End: 2023-12-05
Attending: INTERNAL MEDICINE
Payer: OTHER GOVERNMENT

## 2023-12-05 ENCOUNTER — ANESTHESIA (OUTPATIENT)
Dept: SURGERY | Age: 73
DRG: 267 | End: 2023-12-05
Payer: OTHER GOVERNMENT

## 2023-12-05 DIAGNOSIS — I05.0 MITRAL VALVE STENOSIS, SEVERE: ICD-10-CM

## 2023-12-05 DIAGNOSIS — Z01.818 PREOP TESTING: ICD-10-CM

## 2023-12-05 DIAGNOSIS — I50.22 SYSTOLIC CHF, CHRONIC (HCC): ICD-10-CM

## 2023-12-05 DIAGNOSIS — Z95.2 S/P TRANSCATHETER MITRAL VALVE REPLACEMENT (TMVR): ICD-10-CM

## 2023-12-05 DIAGNOSIS — Z95.2 S/P MVR (MITRAL VALVE REPLACEMENT): Primary | ICD-10-CM

## 2023-12-05 DIAGNOSIS — R06.02 SHORTNESS OF BREATH: ICD-10-CM

## 2023-12-05 LAB
ABO + RH BLD: NORMAL
ANION GAP BLD CALC-SCNC: ABNORMAL MMOL/L
ANION GAP BLD CALC-SCNC: ABNORMAL MMOL/L
BASE DEFICIT BLD-SCNC: 4.2 MMOL/L
BASE DEFICIT BLD-SCNC: 5.3 MMOL/L
BASELINE ACT: 135 SEC
BLD PROD TYP BPU: NORMAL
BLOOD BANK CMNT PATIENT-IMP: NORMAL
BLOOD BANK DISPENSE STATUS: NORMAL
BLOOD GROUP ANTIBODIES SERPL: NORMAL
BPU ID: NORMAL
CA-I BLD-MCNC: 1.21 MMOL/L (ref 1.12–1.32)
CA-I BLD-MCNC: 1.23 MMOL/L (ref 1.12–1.32)
CO2 BLD-SCNC: 20 MMOL/L (ref 13–23)
CO2 BLD-SCNC: 22 MMOL/L (ref 13–23)
CROSSMATCH RESULT: NORMAL
ECHO BSA: 2.27 M2
ECHO MV AREA PHT: 2.2 CM2
ECHO MV E DECELERATION TIME (DT): 338.4 MS
ECHO MV MAX VELOCITY: 1.7 M/S
ECHO MV MEAN GRADIENT: 9 MMHG
ECHO MV MEAN VELOCITY: 1.3 M/S
ECHO MV PEAK GRADIENT: 12 MMHG
ECHO MV PRESSURE HALF TIME (PHT): 98.1 MS
ECHO MV VTI: 35.6 CM
ECHO TV REGURGITANT MAX VELOCITY: 2.23 M/S
ECHO TV REGURGITANT PEAK GRADIENT: 20 MMHG
GLUCOSE BLD STRIP.AUTO-MCNC: 108 MG/DL (ref 65–100)
GLUCOSE BLD STRIP.AUTO-MCNC: 123 MG/DL (ref 65–100)
GLUCOSE BLD STRIP.AUTO-MCNC: 132 MG/DL (ref 65–100)
GLUCOSE BLD STRIP.AUTO-MCNC: 142 MG/DL (ref 65–100)
HCO3 BLD-SCNC: 20.6 MMOL/L (ref 22–26)
HCO3 BLD-SCNC: 22 MMOL/L (ref 22–26)
HEPARIN BOLUS-PATIENT: NORMAL UNITS
HEPARIN BOLUS-PUMP: 0 UNITS
HEPARIN BOLUS-TOTAL: NORMAL UNITS
NT PRO BNP: 2329 PG/ML
PCO2 BLD: 40.6 MMHG (ref 35–45)
PCO2 BLD: 43.8 MMHG (ref 35–45)
PH BLD: 7.31 (ref 7.35–7.45)
PH BLD: 7.31 (ref 7.35–7.45)
PO2 BLD: 90 MMHG (ref 75–100)
PO2 BLD: 99 MMHG (ref 75–100)
POTASSIUM BLD-SCNC: 3.9 MMOL/L (ref 3.5–5.1)
POTASSIUM BLD-SCNC: 4.3 MMOL/L (ref 3.5–5.1)
PROJECTED HEPARIN CONCENTATION: 2.3 MG/KG
SAO2 % BLD: 96 %
SAO2 % BLD: 97 %
SERVICE CMNT-IMP: ABNORMAL
SLOPE: 108
SODIUM BLD-SCNC: 139 MMOL/L (ref 136–145)
SODIUM BLD-SCNC: 143 MMOL/L (ref 136–145)
SPECIMEN EXP DATE BLD: NORMAL
SPECIMEN SITE: ABNORMAL
SPECIMEN SITE: ABNORMAL
UNIT DIVISION: 0

## 2023-12-05 PROCEDURE — 6370000000 HC RX 637 (ALT 250 FOR IP): Performed by: ANESTHESIOLOGY

## 2023-12-05 PROCEDURE — 86850 RBC ANTIBODY SCREEN: CPT

## 2023-12-05 PROCEDURE — 82803 BLOOD GASES ANY COMBINATION: CPT

## 2023-12-05 PROCEDURE — 84295 ASSAY OF SERUM SODIUM: CPT

## 2023-12-05 PROCEDURE — 82947 ASSAY GLUCOSE BLOOD QUANT: CPT

## 2023-12-05 PROCEDURE — 3700000001 HC ADD 15 MINUTES (ANESTHESIA): Performed by: INTERNAL MEDICINE

## 2023-12-05 PROCEDURE — 84132 ASSAY OF SERUM POTASSIUM: CPT

## 2023-12-05 PROCEDURE — 86900 BLOOD TYPING SEROLOGIC ABO: CPT

## 2023-12-05 PROCEDURE — 2500000003 HC RX 250 WO HCPCS: Performed by: REGISTERED NURSE

## 2023-12-05 PROCEDURE — 83880 ASSAY OF NATRIURETIC PEPTIDE: CPT

## 2023-12-05 PROCEDURE — 7100000000 HC PACU RECOVERY - FIRST 15 MIN: Performed by: INTERNAL MEDICINE

## 2023-12-05 PROCEDURE — 82330 ASSAY OF CALCIUM: CPT

## 2023-12-05 PROCEDURE — C1769 GUIDE WIRE: HCPCS | Performed by: INTERNAL MEDICINE

## 2023-12-05 PROCEDURE — 2500000003 HC RX 250 WO HCPCS: Performed by: NURSE ANESTHETIST, CERTIFIED REGISTERED

## 2023-12-05 PROCEDURE — 6360000002 HC RX W HCPCS: Performed by: REGISTERED NURSE

## 2023-12-05 PROCEDURE — 86901 BLOOD TYPING SEROLOGIC RH(D): CPT

## 2023-12-05 PROCEDURE — 2720000010 HC SURG SUPPLY STERILE: Performed by: INTERNAL MEDICINE

## 2023-12-05 PROCEDURE — 6360000002 HC RX W HCPCS: Performed by: INTERNAL MEDICINE

## 2023-12-05 PROCEDURE — C1725 CATH, TRANSLUMIN NON-LASER: HCPCS | Performed by: INTERNAL MEDICINE

## 2023-12-05 PROCEDURE — 3700000000 HC ANESTHESIA ATTENDED CARE: Performed by: INTERNAL MEDICINE

## 2023-12-05 PROCEDURE — C1884 EMBOLIZATION PROTECT SYST: HCPCS | Performed by: INTERNAL MEDICINE

## 2023-12-05 PROCEDURE — 85530 HEPARIN-PROTAMINE TOLERANCE: CPT

## 2023-12-05 PROCEDURE — 02RF38Z REPLACEMENT OF AORTIC VALVE WITH ZOOPLASTIC TISSUE, PERCUTANEOUS APPROACH: ICD-10-PCS | Performed by: INTERNAL MEDICINE

## 2023-12-05 PROCEDURE — 93325 DOPPLER ECHO COLOR FLOW MAPG: CPT

## 2023-12-05 PROCEDURE — C1760 CLOSURE DEV, VASC: HCPCS | Performed by: INTERNAL MEDICINE

## 2023-12-05 PROCEDURE — 2580000003 HC RX 258: Performed by: REGISTERED NURSE

## 2023-12-05 PROCEDURE — C1893 INTRO/SHEATH, FIXED,NON-PEEL: HCPCS | Performed by: INTERNAL MEDICINE

## 2023-12-05 PROCEDURE — 7100000001 HC PACU RECOVERY - ADDTL 15 MIN: Performed by: INTERNAL MEDICINE

## 2023-12-05 PROCEDURE — 86923 COMPATIBILITY TEST ELECTRIC: CPT

## 2023-12-05 PROCEDURE — 2709999900 HC NON-CHARGEABLE SUPPLY: Performed by: INTERNAL MEDICINE

## 2023-12-05 PROCEDURE — 82962 GLUCOSE BLOOD TEST: CPT

## 2023-12-05 PROCEDURE — 93355 ECHO TRANSESOPHAGEAL (TEE): CPT | Performed by: INTERNAL MEDICINE

## 2023-12-05 PROCEDURE — 2140000000 HC CCU INTERMEDIATE R&B

## 2023-12-05 PROCEDURE — 2580000003 HC RX 258: Performed by: INTERNAL MEDICINE

## 2023-12-05 PROCEDURE — 3600000017 HC SURGERY HYBRID ADDL 15MIN: Performed by: INTERNAL MEDICINE

## 2023-12-05 PROCEDURE — 2780000006 HC MISC HEART VALVE: Performed by: INTERNAL MEDICINE

## 2023-12-05 PROCEDURE — 6360000002 HC RX W HCPCS: Performed by: NURSE ANESTHETIST, CERTIFIED REGISTERED

## 2023-12-05 PROCEDURE — 3600000007 HC SURGERY HYBRID BASE: Performed by: INTERNAL MEDICINE

## 2023-12-05 PROCEDURE — C1894 INTRO/SHEATH, NON-LASER: HCPCS | Performed by: INTERNAL MEDICINE

## 2023-12-05 PROCEDURE — 85520 HEPARIN ASSAY: CPT

## 2023-12-05 PROCEDURE — 6370000000 HC RX 637 (ALT 250 FOR IP): Performed by: INTERNAL MEDICINE

## 2023-12-05 DEVICE — SYSTEM CARD 29MM BOV WITH COMMAND DEL SYTEM ESHEATH INTRO SET: Type: IMPLANTABLE DEVICE | Site: MITRAL VALVE | Status: FUNCTIONAL

## 2023-12-05 RX ORDER — SODIUM CHLORIDE 9 MG/ML
INJECTION, SOLUTION INTRAVENOUS CONTINUOUS
Status: DISCONTINUED | OUTPATIENT
Start: 2023-12-05 | End: 2023-12-05 | Stop reason: HOSPADM

## 2023-12-05 RX ORDER — SODIUM CHLORIDE 0.9 % (FLUSH) 0.9 %
5-40 SYRINGE (ML) INJECTION PRN
Status: DISCONTINUED | OUTPATIENT
Start: 2023-12-05 | End: 2023-12-05 | Stop reason: HOSPADM

## 2023-12-05 RX ORDER — DIPHENHYDRAMINE HYDROCHLORIDE 50 MG/ML
12.5 INJECTION INTRAMUSCULAR; INTRAVENOUS
Status: DISCONTINUED | OUTPATIENT
Start: 2023-12-05 | End: 2023-12-05 | Stop reason: HOSPADM

## 2023-12-05 RX ORDER — LOSARTAN POTASSIUM 50 MG/1
100 TABLET ORAL NIGHTLY
Status: DISCONTINUED | OUTPATIENT
Start: 2023-12-05 | End: 2023-12-06 | Stop reason: HOSPADM

## 2023-12-05 RX ORDER — ASPIRIN 81 MG/1
81 TABLET, CHEWABLE ORAL DAILY
Status: DISCONTINUED | OUTPATIENT
Start: 2023-12-06 | End: 2023-12-06 | Stop reason: HOSPADM

## 2023-12-05 RX ORDER — MAGNESIUM HYDROXIDE/ALUMINUM HYDROXICE/SIMETHICONE 120; 1200; 1200 MG/30ML; MG/30ML; MG/30ML
15 SUSPENSION ORAL EVERY 6 HOURS PRN
Status: DISCONTINUED | OUTPATIENT
Start: 2023-12-05 | End: 2023-12-06 | Stop reason: HOSPADM

## 2023-12-05 RX ORDER — SODIUM CHLORIDE 0.9 % (FLUSH) 0.9 %
5-40 SYRINGE (ML) INJECTION PRN
Status: DISCONTINUED | OUTPATIENT
Start: 2023-12-05 | End: 2023-12-06 | Stop reason: HOSPADM

## 2023-12-05 RX ORDER — ROCURONIUM BROMIDE 10 MG/ML
INJECTION, SOLUTION INTRAVENOUS PRN
Status: DISCONTINUED | OUTPATIENT
Start: 2023-12-05 | End: 2023-12-05 | Stop reason: SDUPTHER

## 2023-12-05 RX ORDER — NOREPINEPHRINE BITARTRATE 1 MG/ML
INJECTION, SOLUTION INTRAVENOUS PRN
Status: DISCONTINUED | OUTPATIENT
Start: 2023-12-05 | End: 2023-12-05 | Stop reason: SDUPTHER

## 2023-12-05 RX ORDER — METOPROLOL SUCCINATE 100 MG/1
100 TABLET, EXTENDED RELEASE ORAL DAILY
Status: DISCONTINUED | OUTPATIENT
Start: 2023-12-06 | End: 2023-12-06

## 2023-12-05 RX ORDER — ETOMIDATE 2 MG/ML
INJECTION INTRAVENOUS PRN
Status: DISCONTINUED | OUTPATIENT
Start: 2023-12-05 | End: 2023-12-05 | Stop reason: SDUPTHER

## 2023-12-05 RX ORDER — ATORVASTATIN CALCIUM 20 MG/1
20 TABLET, FILM COATED ORAL DAILY
Status: DISCONTINUED | OUTPATIENT
Start: 2023-12-05 | End: 2023-12-06 | Stop reason: HOSPADM

## 2023-12-05 RX ORDER — MIDAZOLAM HYDROCHLORIDE 2 MG/2ML
2 INJECTION, SOLUTION INTRAMUSCULAR; INTRAVENOUS
Status: DISCONTINUED | OUTPATIENT
Start: 2023-12-05 | End: 2023-12-05 | Stop reason: HOSPADM

## 2023-12-05 RX ORDER — SODIUM CHLORIDE 9 MG/ML
INJECTION, SOLUTION INTRAVENOUS PRN
Status: DISCONTINUED | OUTPATIENT
Start: 2023-12-05 | End: 2023-12-05 | Stop reason: HOSPADM

## 2023-12-05 RX ORDER — HYDROCODONE BITARTRATE AND ACETAMINOPHEN 5; 325 MG/1; MG/1
1 TABLET ORAL EVERY 6 HOURS PRN
Status: DISCONTINUED | OUTPATIENT
Start: 2023-12-05 | End: 2023-12-06 | Stop reason: HOSPADM

## 2023-12-05 RX ORDER — FAMOTIDINE 20 MG/1
20 TABLET, FILM COATED ORAL ONCE
Status: COMPLETED | OUTPATIENT
Start: 2023-12-05 | End: 2023-12-05

## 2023-12-05 RX ORDER — LIDOCAINE HYDROCHLORIDE 20 MG/ML
INJECTION, SOLUTION EPIDURAL; INFILTRATION; INTRACAUDAL; PERINEURAL PRN
Status: DISCONTINUED | OUTPATIENT
Start: 2023-12-05 | End: 2023-12-05 | Stop reason: SDUPTHER

## 2023-12-05 RX ORDER — SODIUM CHLORIDE, SODIUM LACTATE, POTASSIUM CHLORIDE, CALCIUM CHLORIDE 600; 310; 30; 20 MG/100ML; MG/100ML; MG/100ML; MG/100ML
INJECTION, SOLUTION INTRAVENOUS CONTINUOUS PRN
Status: DISCONTINUED | OUTPATIENT
Start: 2023-12-05 | End: 2023-12-05 | Stop reason: SDUPTHER

## 2023-12-05 RX ORDER — SODIUM CHLORIDE 9 MG/ML
INJECTION, SOLUTION INTRAVENOUS PRN
Status: DISCONTINUED | OUTPATIENT
Start: 2023-12-05 | End: 2023-12-06 | Stop reason: HOSPADM

## 2023-12-05 RX ORDER — HYDROMORPHONE HYDROCHLORIDE 2 MG/ML
0.25 INJECTION, SOLUTION INTRAMUSCULAR; INTRAVENOUS; SUBCUTANEOUS EVERY 5 MIN PRN
Status: DISCONTINUED | OUTPATIENT
Start: 2023-12-05 | End: 2023-12-05 | Stop reason: HOSPADM

## 2023-12-05 RX ORDER — SODIUM CHLORIDE, SODIUM LACTATE, POTASSIUM CHLORIDE, CALCIUM CHLORIDE 600; 310; 30; 20 MG/100ML; MG/100ML; MG/100ML; MG/100ML
INJECTION, SOLUTION INTRAVENOUS CONTINUOUS
Status: DISCONTINUED | OUTPATIENT
Start: 2023-12-05 | End: 2023-12-06

## 2023-12-05 RX ORDER — OXYCODONE HYDROCHLORIDE 5 MG/1
5 TABLET ORAL PRN
Status: DISCONTINUED | OUTPATIENT
Start: 2023-12-05 | End: 2023-12-05 | Stop reason: HOSPADM

## 2023-12-05 RX ORDER — OXYCODONE HYDROCHLORIDE 5 MG/1
10 TABLET ORAL PRN
Status: DISCONTINUED | OUTPATIENT
Start: 2023-12-05 | End: 2023-12-05 | Stop reason: HOSPADM

## 2023-12-05 RX ORDER — HYDROMORPHONE HYDROCHLORIDE 2 MG/ML
0.5 INJECTION, SOLUTION INTRAMUSCULAR; INTRAVENOUS; SUBCUTANEOUS EVERY 10 MIN PRN
Status: DISCONTINUED | OUTPATIENT
Start: 2023-12-05 | End: 2023-12-05 | Stop reason: HOSPADM

## 2023-12-05 RX ORDER — HEPARIN SODIUM 1000 [USP'U]/ML
INJECTION, SOLUTION INTRAVENOUS; SUBCUTANEOUS PRN
Status: DISCONTINUED | OUTPATIENT
Start: 2023-12-05 | End: 2023-12-05 | Stop reason: SDUPTHER

## 2023-12-05 RX ORDER — SODIUM CHLORIDE 0.9 % (FLUSH) 0.9 %
5-40 SYRINGE (ML) INJECTION EVERY 12 HOURS SCHEDULED
Status: DISCONTINUED | OUTPATIENT
Start: 2023-12-05 | End: 2023-12-06 | Stop reason: HOSPADM

## 2023-12-05 RX ORDER — LIDOCAINE HYDROCHLORIDE 10 MG/ML
1 INJECTION, SOLUTION INFILTRATION; PERINEURAL
Status: DISCONTINUED | OUTPATIENT
Start: 2023-12-05 | End: 2023-12-05 | Stop reason: HOSPADM

## 2023-12-05 RX ORDER — ACETAMINOPHEN 325 MG/1
650 TABLET ORAL EVERY 4 HOURS PRN
Status: DISCONTINUED | OUTPATIENT
Start: 2023-12-05 | End: 2023-12-06 | Stop reason: HOSPADM

## 2023-12-05 RX ORDER — SODIUM CHLORIDE, SODIUM LACTATE, POTASSIUM CHLORIDE, CALCIUM CHLORIDE 600; 310; 30; 20 MG/100ML; MG/100ML; MG/100ML; MG/100ML
INJECTION, SOLUTION INTRAVENOUS CONTINUOUS
Status: DISCONTINUED | OUTPATIENT
Start: 2023-12-05 | End: 2023-12-05 | Stop reason: HOSPADM

## 2023-12-05 RX ORDER — SODIUM CHLORIDE 0.9 % (FLUSH) 0.9 %
5-40 SYRINGE (ML) INJECTION EVERY 12 HOURS SCHEDULED
Status: DISCONTINUED | OUTPATIENT
Start: 2023-12-05 | End: 2023-12-05 | Stop reason: HOSPADM

## 2023-12-05 RX ORDER — SODIUM CHLORIDE 9 MG/ML
INJECTION, SOLUTION INTRAVENOUS CONTINUOUS
Status: ACTIVE | OUTPATIENT
Start: 2023-12-05 | End: 2023-12-05

## 2023-12-05 RX ORDER — ONDANSETRON 2 MG/ML
4 INJECTION INTRAMUSCULAR; INTRAVENOUS EVERY 6 HOURS PRN
Status: DISCONTINUED | OUTPATIENT
Start: 2023-12-05 | End: 2023-12-06 | Stop reason: HOSPADM

## 2023-12-05 RX ORDER — ONDANSETRON 2 MG/ML
4 INJECTION INTRAMUSCULAR; INTRAVENOUS
Status: DISCONTINUED | OUTPATIENT
Start: 2023-12-05 | End: 2023-12-05 | Stop reason: HOSPADM

## 2023-12-05 RX ORDER — FENTANYL CITRATE 50 UG/ML
100 INJECTION, SOLUTION INTRAMUSCULAR; INTRAVENOUS
Status: DISCONTINUED | OUTPATIENT
Start: 2023-12-05 | End: 2023-12-05 | Stop reason: HOSPADM

## 2023-12-05 RX ORDER — AMLODIPINE BESYLATE 10 MG/1
10 TABLET ORAL DAILY
Status: DISCONTINUED | OUTPATIENT
Start: 2023-12-06 | End: 2023-12-06

## 2023-12-05 RX ORDER — ONDANSETRON 2 MG/ML
INJECTION INTRAMUSCULAR; INTRAVENOUS PRN
Status: DISCONTINUED | OUTPATIENT
Start: 2023-12-05 | End: 2023-12-05 | Stop reason: SDUPTHER

## 2023-12-05 RX ORDER — ACETAMINOPHEN 500 MG
1000 TABLET ORAL ONCE
Status: COMPLETED | OUTPATIENT
Start: 2023-12-05 | End: 2023-12-05

## 2023-12-05 RX ADMIN — PHENYLEPHRINE HYDROCHLORIDE 100 MCG: 10 INJECTION INTRAVENOUS at 14:13

## 2023-12-05 RX ADMIN — PHENYLEPHRINE HYDROCHLORIDE 100 MCG: 10 INJECTION INTRAVENOUS at 14:24

## 2023-12-05 RX ADMIN — ROCURONIUM BROMIDE 50 MG: 10 INJECTION, SOLUTION INTRAVENOUS at 13:14

## 2023-12-05 RX ADMIN — ATORVASTATIN CALCIUM 20 MG: 20 TABLET, FILM COATED ORAL at 19:24

## 2023-12-05 RX ADMIN — NOREPINEPHRINE BITARTRATE 8 MCG: 1 SOLUTION INTRAVENOUS at 14:27

## 2023-12-05 RX ADMIN — ROCURONIUM BROMIDE 10 MG: 10 INJECTION, SOLUTION INTRAVENOUS at 14:01

## 2023-12-05 RX ADMIN — SODIUM CHLORIDE, SODIUM LACTATE, POTASSIUM CHLORIDE, AND CALCIUM CHLORIDE: 600; 310; 30; 20 INJECTION, SOLUTION INTRAVENOUS at 12:59

## 2023-12-05 RX ADMIN — SUGAMMADEX 200 MG: 100 INJECTION, SOLUTION INTRAVENOUS at 14:45

## 2023-12-05 RX ADMIN — CEFAZOLIN SODIUM 2000 MG: 100 INJECTION, POWDER, LYOPHILIZED, FOR SOLUTION INTRAVENOUS at 22:58

## 2023-12-05 RX ADMIN — PHENYLEPHRINE HYDROCHLORIDE 100 MCG: 10 INJECTION INTRAVENOUS at 13:34

## 2023-12-05 RX ADMIN — LOSARTAN POTASSIUM 100 MG: 50 TABLET, FILM COATED ORAL at 19:22

## 2023-12-05 RX ADMIN — PHENYLEPHRINE HYDROCHLORIDE 100 MCG: 10 INJECTION INTRAVENOUS at 13:31

## 2023-12-05 RX ADMIN — PHENYLEPHRINE HYDROCHLORIDE 100 MCG: 10 INJECTION INTRAVENOUS at 13:39

## 2023-12-05 RX ADMIN — FAMOTIDINE 20 MG: 20 TABLET ORAL at 11:59

## 2023-12-05 RX ADMIN — FENTANYL CITRATE 100 MCG: 50 INJECTION INTRAMUSCULAR; INTRAVENOUS at 13:01

## 2023-12-05 RX ADMIN — SODIUM CHLORIDE: 9 INJECTION, SOLUTION INTRAVENOUS at 17:35

## 2023-12-05 RX ADMIN — SODIUM CHLORIDE, SODIUM LACTATE, POTASSIUM CHLORIDE, CALCIUM CHLORIDE: 600; 310; 30; 20 INJECTION, SOLUTION INTRAVENOUS at 12:59

## 2023-12-05 RX ADMIN — Medication 2000 MG: at 13:24

## 2023-12-05 RX ADMIN — HEPARIN SODIUM 5000 UNITS: 1000 INJECTION, SOLUTION INTRAVENOUS; SUBCUTANEOUS at 14:28

## 2023-12-05 RX ADMIN — HEPARIN SODIUM 7000 UNITS: 1000 INJECTION, SOLUTION INTRAVENOUS; SUBCUTANEOUS at 13:45

## 2023-12-05 RX ADMIN — LIDOCAINE HYDROCHLORIDE 100 MG: 20 INJECTION, SOLUTION EPIDURAL; INFILTRATION; INTRACAUDAL; PERINEURAL at 13:14

## 2023-12-05 RX ADMIN — ROCURONIUM BROMIDE 15 MG: 10 INJECTION, SOLUTION INTRAVENOUS at 14:20

## 2023-12-05 RX ADMIN — HEPARIN SODIUM 10000 UNITS: 1000 INJECTION, SOLUTION INTRAVENOUS; SUBCUTANEOUS at 14:17

## 2023-12-05 RX ADMIN — NOREPINEPHRINE BITARTRATE 8 MCG: 1 SOLUTION INTRAVENOUS at 14:29

## 2023-12-05 RX ADMIN — ETOMIDATE 40 MG: 2 INJECTION, SOLUTION INTRAVENOUS at 13:14

## 2023-12-05 RX ADMIN — NOREPINEPHRINE BITARTRATE 4 MCG: 1 SOLUTION INTRAVENOUS at 14:25

## 2023-12-05 RX ADMIN — ONDANSETRON 4 MG: 2 INJECTION INTRAMUSCULAR; INTRAVENOUS at 14:37

## 2023-12-05 RX ADMIN — ACETAMINOPHEN 1000 MG: 500 TABLET, FILM COATED ORAL at 11:58

## 2023-12-05 RX ADMIN — SODIUM CHLORIDE, PRESERVATIVE FREE 10 ML: 5 INJECTION INTRAVENOUS at 19:25

## 2023-12-05 ASSESSMENT — PAIN - FUNCTIONAL ASSESSMENT
PAIN_FUNCTIONAL_ASSESSMENT: NONE - DENIES PAIN
PAIN_FUNCTIONAL_ASSESSMENT: 0-10

## 2023-12-05 NOTE — ANESTHESIA PRE PROCEDURE
(PRESERVISION/LUTEIN PO) Take by mouth    Stefani Donohue MD   NONFORMULARY Test. booster    Stefani Donohue MD   ASPIRIN 81 PO Take by mouth    Stefani Donohue MD   metoprolol succinate (TOPROL XL) 100 MG extended release tablet Take 2 tablets by mouth daily    Stefani Donohue MD   SEMAGLUTIDE, 1 MG/DOSE, SC Inject 1 mg into the skin once a week Ozempic takes every sunday    ProviderStefani MD       Current medications:    Current Facility-Administered Medications   Medication Dose Route Frequency Provider Last Rate Last Admin    lidocaine 1 % injection 1 mL  1 mL IntraDERmal Once PRN Kely Roy MD        acetaminophen (TYLENOL) tablet 1,000 mg  1,000 mg Oral Once Kely Roy MD        fentaNYL (SUBLIMAZE) injection 100 mcg  100 mcg IntraVENous Once PRN Kely Roy MD        famotidine (PEPCID) tablet 20 mg  20 mg Oral Once Kely Roy MD        0.9 % sodium chloride infusion   IntraVENous Continuous Kely Roy MD        lactated ringers IV soln infusion   IntraVENous Continuous Kely Roy MD        sodium chloride flush 0.9 % injection 5-40 mL  5-40 mL IntraVENous 2 times per day Kely Roy MD        sodium chloride flush 0.9 % injection 5-40 mL  5-40 mL IntraVENous PRN Kely Roy MD        0.9 % sodium chloride infusion   IntraVENous PRN Kely Roy MD        midazolam PF (VERSED) injection 2 mg  2 mg IntraVENous Once PRN Kely Roy MD        ceFAZolin (ANCEF) 2000 mg in sterile water 20 mL IV syringe  2,000 mg IntraVENous On Call to OR Edward Tomlin MD           Allergies:  No Known Allergies    Problem List:    Patient Active Problem List   Diagnosis Code    Hypoxia R09.02    Encounter for weaning from ventilator ([address]) Z99.11    S/P MVR (mitral valve replacement) Z95.2    Tricuspid valve disorders, 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

## 2023-12-05 NOTE — TELEPHONE ENCOUNTER
Asked Cheng Mims via teams messaging:  Judy rTipp  i do not but [place] Tristan from [employer] who does our verifications is the one who knows all about that stuff, she gets the [facility].  they need to reach out to her  [1:42 PM] Jduy Tripp  i spoke to her and she had the [address]

## 2023-12-05 NOTE — BRIEF OP NOTE
Brief Postoperative Note      Patient: Martínez Alcaraz  YOB: 1950  MRN: [id number]    Date of Procedure: 12/5/2023    Pre-Op Diagnosis Codes:     * Mitral valve stenosis, severe [I05.0]    Post-Op Diagnosis: Same       Procedure(s):  Transcatheter mitral valve replacement    Surgeon(s):  MD Juanita Reese MD Nessmith, Racheal Oscar, MD    Assistant:  Surgical Assistant: Freedom Kent    Anesthesia: General    Estimated Blood Loss (mL): Minimal    Complications: None    Specimens:   * No specimens in log *    Implants:  Implant Name Type Inv.  Item Serial No.  Lot No. LRB No. Used Action   SYSTEM CARD 29MM BOV WITH COMMAND DEL SYTEM ESHEATH INTRO SET - [id number] Aortic valves SYSTEM CARD 29MM BOV WITH COMMAND DEL SYTEM ESHEATH INTRO SET [id number]-  N/A 1 Implanted         Drains: * No LDAs found *    Findings: MS      Electronically signed by Bard Amira MD on 12/5/2023 at 2:55 PM

## 2023-12-05 NOTE — ANESTHESIA PROCEDURE NOTES
Airway  Date/Time: 12/5/2023 1:21 PM  Urgency: elective    Airway not difficult    General Information and Staff    Patient location during procedure: OR  Resident/CRNA: DENIZ Mata CRNA  Performed: resident/CRNA   Performed by:  DENIZ Mata CRNA  Authorized by: Neil Raman MD      Indications and Patient Condition  Indications for airway management: anesthesia  Spontaneous Ventilation: absent  Sedation level: deep  Preoxygenated: yes  Patient position: sniffing  MILS not maintained throughout  Mask difficulty assessment: difficult bag mask (inadequate, unstable or two providers) +/- NMBA    Final Airway Details  Final airway type: endotracheal airway      Successful airway: ETT  Cuffed: yes   Successful intubation technique: video laryngoscopy  Facilitating devices/methods: intubating stylet  Endotracheal tube insertion site: oral  ETT size (mm): 8.0  Cormack-Lehane Classification: grade I - full view of glottis  Placement verified by: chest auscultation and capnometry   Measured from: lips  ETT to lips (cm): 24  Number of attempts at approach: 1  Ventilation between attempts: bag mask  Number of other approaches attempted: 0    Additional Comments  Anterior glottis, ETT atraumatically placed   no

## 2023-12-05 NOTE — OP NOTE
Operative Report    Patient: Jessica Holt MRN: [id number]  SSN: [id number]    YOB: 1950  Age: 74 y.o. Sex: male       Date of Surgery: 12/5/2023     Preoperative Diagnosis: Mitral valve stenosis, severe [I05.0]     Postoperative Diagnosis: * No post-op diagnosis entered *     Primary Cardiothoracic Surgeon: Jarod Tejada MD     Primary Interventional Cardiologist: Roman Amador MD    Anesthesia: General     Procedure: Procedure(s):  Transcatheter mitral valve replacement   Transcatheter aortic valve replacement (29 mm Castelan Brady 3 transcatheter aortic valve via right common femoral artery percutaneous approach). Findings:  Successful deployment of a 29 mm Castelan Brady 3 transcatheter aortic valve via right common femoral artery percutaneous approach. moderate LV function with no perivalvular regurgitation. The patient was in unchanged from previous tracings at the conclusion of the procedure. Indication for Procedure: The patient is a 74 y.o. male with symptomatic severe aortic stenosis. He was not felt to be a candidate for surgical aortic valve replacement and after discussion at the multidisciplinary valve board transcatheter aortic valve replacement was recommended. After a thorough discussion of all the risks and proposed benefits, the patient agreed to proceed. Procedure in Detail:   Patient premedicated and brought to the operating suite. Time-out performed. Standard monitoring lines placed, and the patient was intubated and placed under general anesthesia without event. Transesophageal echocardiogram was performed, confirming the above valve pathology. Intravenous cefazolin was administered. Patient prepped and draped in standard, meticulous surgical fashion. Ioban drapes were used. Access to the left common femoral artery and vein was obtained under ultrasound guidance, and 6-Tamazight sheaths were placed.  A temporary transvenous right ventricular pacing wire

## 2023-12-05 NOTE — TELEPHONE ENCOUNTER
Cookie Lennox with Admissions @ [facility] called stating the following :    Patient is scheduled for a procedure with Dr Gonzalez Patino.    Cookie Lennox needs the [place] prior authorization form scanned into EPIC  Needs same form faxed to her in admissions [phone]

## 2023-12-05 NOTE — ANESTHESIA POSTPROCEDURE EVALUATION
Department of Anesthesiology  Postprocedure Note    Patient: Nirali Crook  MRN: [id number]  YOB: 1950  Date of evaluation: 12/5/2023      Procedure Summary       Date: 12/05/23 Room / Location: [facility] MAIN OR 12 / [facility] MAIN OR    Anesthesia Start: 1259 Anesthesia Stop: 6897    Procedure: Transcatheter mitral valve replacement ([place]) Diagnosis:       Mitral valve stenosis, severe      (Mitral valve stenosis, severe [I05.0])    Providers: America Cam MD Responsible Provider: Xin Lopez MD    Anesthesia Type: general ASA Status: 3            Anesthesia Type: No value filed.     Dexter Phase I: Dexter Score: 8    Dexter Phase II:        Anesthesia Post Evaluation    Patient location during evaluation: PACU  Patient participation: complete - patient participated  Level of consciousness: awake and alert  Airway patency: patent  Nausea & Vomiting: no nausea  Cardiovascular status: hemodynamically stable  Respiratory status: acceptable  Hydration status: euvolemic  Pain management: adequate and satisfactory to patient

## 2023-12-06 ENCOUNTER — APPOINTMENT (OUTPATIENT)
Dept: GENERAL RADIOLOGY | Age: 73
DRG: 267 | End: 2023-12-06
Attending: INTERNAL MEDICINE
Payer: OTHER GOVERNMENT

## 2023-12-06 ENCOUNTER — APPOINTMENT (OUTPATIENT)
Dept: NON INVASIVE DIAGNOSTICS | Age: 73
DRG: 267 | End: 2023-12-06
Attending: INTERNAL MEDICINE
Payer: OTHER GOVERNMENT

## 2023-12-06 VITALS
DIASTOLIC BLOOD PRESSURE: 68 MMHG | WEIGHT: 225.9 LBS | HEIGHT: 71 IN | RESPIRATION RATE: 20 BRPM | SYSTOLIC BLOOD PRESSURE: 115 MMHG | TEMPERATURE: 98.1 F | BODY MASS INDEX: 31.63 KG/M2 | HEART RATE: 93 BPM | OXYGEN SATURATION: 88 %

## 2023-12-06 PROBLEM — N39.0 UTI (URINARY TRACT INFECTION): Status: ACTIVE | Noted: 2023-12-06

## 2023-12-06 PROBLEM — I05.0 MITRAL VALVE STENOSIS, SEVERE: Status: RESOLVED | Noted: 2023-12-01 | Resolved: 2023-12-06

## 2023-12-06 LAB
ACT AVERAGE - AAVG: 274 SEC
ANION GAP SERPL CALC-SCNC: 3 MMOL/L (ref 2–11)
BACTERIA SPEC CULT: ABNORMAL
BUN SERPL-MCNC: 8 MG/DL (ref 8–23)
CALCIUM SERPL-MCNC: 8.7 MG/DL (ref 8.3–10.4)
CHLORIDE SERPL-SCNC: 113 MMOL/L (ref 103–113)
CO2 SERPL-SCNC: 24 MMOL/L (ref 21–32)
CREAT SERPL-MCNC: 0.8 MG/DL (ref 0.8–1.5)
ECHO AO ASC DIAM: 3.8 CM
ECHO AO ASCENDING AORTA INDEX: 1.71 CM/M2
ECHO AO ROOT DIAM: 3.6 CM
ECHO AO ROOT INDEX: 1.62 CM/M2
ECHO AV AREA PEAK VELOCITY: 1.8 CM2
ECHO AV AREA VTI: 2 CM2
ECHO AV AREA/BSA PEAK VELOCITY: 0.8 CM2/M2
ECHO AV AREA/BSA VTI: 0.9 CM2/M2
ECHO AV MEAN GRADIENT: 7 MMHG
ECHO AV MEAN VELOCITY: 1.2 M/S
ECHO AV PEAK GRADIENT: 13 MMHG
ECHO AV PEAK VELOCITY: 1.8 M/S
ECHO AV VELOCITY RATIO: 0.61
ECHO AV VTI: 31.1 CM
ECHO BSA: 2.27 M2
ECHO EST RA PRESSURE: 8 MMHG
ECHO IVC PROX: 2.5 CM
ECHO LA AREA 2C: 21 CM2
ECHO LA AREA 4C: 23.3 CM2
ECHO LA DIAMETER INDEX: 1.89 CM/M2
ECHO LA DIAMETER: 4.2 CM
ECHO LA MAJOR AXIS: 6.7 CM
ECHO LA MINOR AXIS: 5.1 CM
ECHO LA TO AORTIC ROOT RATIO: 1.17
ECHO LA VOL MOD A2C: 69 ML (ref 18–58)
ECHO LA VOL MOD A4C: 67 ML (ref 18–58)
ECHO LA VOLUME INDEX MOD A2C: 31 ML/M2 (ref 16–34)
ECHO LA VOLUME INDEX MOD A4C: 30 ML/M2 (ref 16–34)
ECHO LV EDV A2C: 110 ML
ECHO LV EDV A4C: 97 ML
ECHO LV EDV INDEX A4C: 44 ML/M2
ECHO LV EDV NDEX A2C: 50 ML/M2
ECHO LV EJECTION FRACTION A2C: 59 %
ECHO LV EJECTION FRACTION A4C: 57 %
ECHO LV EJECTION FRACTION BIPLANE: 57 % (ref 55–100)
ECHO LV ESV A2C: 45 ML
ECHO LV ESV A4C: 41 ML
ECHO LV ESV INDEX A2C: 20 ML/M2
ECHO LV ESV INDEX A4C: 18 ML/M2
ECHO LV FRACTIONAL SHORTENING: 29 % (ref 28–44)
ECHO LV INTERNAL DIMENSION DIASTOLE INDEX: 2.52 CM/M2
ECHO LV INTERNAL DIMENSION DIASTOLIC: 5.6 CM (ref 4.2–5.9)
ECHO LV INTERNAL DIMENSION SYSTOLIC INDEX: 1.8 CM/M2
ECHO LV INTERNAL DIMENSION SYSTOLIC: 4 CM
ECHO LV IVSD: 0.9 CM (ref 0.6–1)
ECHO LV MASS 2D: 191.6 G (ref 88–224)
ECHO LV MASS INDEX 2D: 86.3 G/M2 (ref 49–115)
ECHO LV POSTERIOR WALL DIASTOLIC: 0.9 CM (ref 0.6–1)
ECHO LV RELATIVE WALL THICKNESS RATIO: 0.32
ECHO LVOT AREA: 3.1 CM2
ECHO LVOT AV VTI INDEX: 0.64
ECHO LVOT DIAM: 2 CM
ECHO LVOT MEAN GRADIENT: 3 MMHG
ECHO LVOT PEAK GRADIENT: 4 MMHG
ECHO LVOT PEAK VELOCITY: 1.1 M/S
ECHO LVOT STROKE VOLUME INDEX: 28 ML/M2
ECHO LVOT SV: 62.2 ML
ECHO LVOT VTI: 19.8 CM
ECHO MV AREA VTI: 1.5 CM2
ECHO MV LVOT VTI INDEX: 2.15
ECHO MV MAX VELOCITY: 1.9 M/S
ECHO MV MEAN GRADIENT: 8 MMHG
ECHO MV MEAN VELOCITY: 1.3 M/S
ECHO MV PEAK GRADIENT: 14 MMHG
ECHO MV VTI: 42.5 CM
ECHO PULMONARY ARTERY END DIASTOLIC PRESSURE: 5 MMHG
ECHO PV ACCELERATION TIME (AT): 79 MS
ECHO PV MAX VELOCITY: 1 M/S
ECHO PV PEAK GRADIENT: 4 MMHG
ECHO PV REGURGITANT MAX VELOCITY: 1.2 M/S
ECHO RIGHT VENTRICULAR SYSTOLIC PRESSURE (RVSP): 79 MMHG
ECHO RV FREE WALL PEAK S': 10 CM/S
ECHO RV INTERNAL DIMENSION: 3.9 CM
ECHO RV TAPSE: 1.7 CM (ref 1.7–?)
ECHO TV REGURGITANT MAX VELOCITY: 4.21 M/S
ECHO TV REGURGITANT PEAK GRADIENT: 71 MMHG
ERYTHROCYTE [DISTWIDTH] IN BLOOD BY AUTOMATED COUNT: 15.4 % (ref 11.9–14.6)
GLUCOSE SERPL-MCNC: 135 MG/DL (ref 65–100)
HCT VFR BLD AUTO: 37 % (ref 41.1–50.3)
HEPARIN REQUIRED-PATIENT: 5402
HEPARIN REQUIRED-TOTAL: 5402 UNITS
HEPARIN TEST CONCENTRATE: 0.9 MG/KG
HGB BLD-MCNC: 11.1 G/DL (ref 13.6–17.2)
MCH RBC QN AUTO: 28.3 PG (ref 26.1–32.9)
MCHC RBC AUTO-ENTMCNC: 30 G/DL (ref 31.4–35)
MCV RBC AUTO: 94.4 FL (ref 82–102)
NRBC # BLD: 0 K/UL (ref 0–0.2)
PLATELET # BLD AUTO: 213 K/UL (ref 150–450)
PMV BLD AUTO: 9.4 FL (ref 9.4–12.3)
POTASSIUM SERPL-SCNC: 4 MMOL/L (ref 3.5–5.1)
PROTAMINE DOSE-PUMP: 0 MG
PROTAMINE DOSE-TOTAL: 95 MG
RBC # BLD AUTO: 3.92 M/UL (ref 4.23–5.6)
SERVICE CMNT-IMP: ABNORMAL
SODIUM SERPL-SCNC: 140 MMOL/L (ref 136–146)
WBC # BLD AUTO: 9.5 K/UL (ref 4.3–11.1)

## 2023-12-06 PROCEDURE — 6360000002 HC RX W HCPCS: Performed by: INTERNAL MEDICINE

## 2023-12-06 PROCEDURE — 99232 SBSQ HOSP IP/OBS MODERATE 35: CPT | Performed by: INTERNAL MEDICINE

## 2023-12-06 PROCEDURE — 6360000002 HC RX W HCPCS: Performed by: NURSE PRACTITIONER

## 2023-12-06 PROCEDURE — 6370000000 HC RX 637 (ALT 250 FOR IP): Performed by: NURSE PRACTITIONER

## 2023-12-06 PROCEDURE — 6370000000 HC RX 637 (ALT 250 FOR IP): Performed by: INTERNAL MEDICINE

## 2023-12-06 PROCEDURE — 36415 COLL VENOUS BLD VENIPUNCTURE: CPT

## 2023-12-06 PROCEDURE — 71045 X-RAY EXAM CHEST 1 VIEW: CPT

## 2023-12-06 PROCEDURE — 2580000003 HC RX 258: Performed by: INTERNAL MEDICINE

## 2023-12-06 PROCEDURE — 85027 COMPLETE CBC AUTOMATED: CPT

## 2023-12-06 PROCEDURE — 93306 TTE W/DOPPLER COMPLETE: CPT

## 2023-12-06 PROCEDURE — 80048 BASIC METABOLIC PNL TOTAL CA: CPT

## 2023-12-06 RX ORDER — AMLODIPINE BESYLATE 5 MG/1
5 TABLET ORAL DAILY
Status: DISCONTINUED | OUTPATIENT
Start: 2023-12-07 | End: 2023-12-06 | Stop reason: HOSPADM

## 2023-12-06 RX ORDER — FUROSEMIDE 10 MG/ML
40 INJECTION INTRAMUSCULAR; INTRAVENOUS ONCE
Status: COMPLETED | OUTPATIENT
Start: 2023-12-06 | End: 2023-12-06

## 2023-12-06 RX ORDER — FUROSEMIDE 40 MG/1
40 TABLET ORAL DAILY
Qty: 60 TABLET | Refills: 3 | Status: SHIPPED | OUTPATIENT
Start: 2023-12-06

## 2023-12-06 RX ORDER — FUROSEMIDE 40 MG/1
40 TABLET ORAL DAILY
Status: DISCONTINUED | OUTPATIENT
Start: 2023-12-06 | End: 2023-12-06 | Stop reason: HOSPADM

## 2023-12-06 RX ORDER — POTASSIUM CHLORIDE 20 MEQ/1
20 TABLET, EXTENDED RELEASE ORAL DAILY
Status: DISCONTINUED | OUTPATIENT
Start: 2023-12-06 | End: 2023-12-06 | Stop reason: HOSPADM

## 2023-12-06 RX ORDER — METOPROLOL SUCCINATE 100 MG/1
100 TABLET, EXTENDED RELEASE ORAL 2 TIMES DAILY
Status: DISCONTINUED | OUTPATIENT
Start: 2023-12-06 | End: 2023-12-06 | Stop reason: HOSPADM

## 2023-12-06 RX ORDER — SULFAMETHOXAZOLE AND TRIMETHOPRIM 800; 160 MG/1; MG/1
1 TABLET ORAL EVERY 12 HOURS SCHEDULED
Qty: 14 TABLET | Refills: 0 | Status: SHIPPED | OUTPATIENT
Start: 2023-12-06 | End: 2023-12-13

## 2023-12-06 RX ORDER — AMLODIPINE BESYLATE 5 MG/1
5 TABLET ORAL DAILY
Qty: 30 TABLET | Refills: 3 | Status: SHIPPED | OUTPATIENT
Start: 2023-12-07

## 2023-12-06 RX ORDER — SULFAMETHOXAZOLE AND TRIMETHOPRIM 800; 160 MG/1; MG/1
1 TABLET ORAL EVERY 12 HOURS SCHEDULED
Status: DISCONTINUED | OUTPATIENT
Start: 2023-12-06 | End: 2023-12-06 | Stop reason: HOSPADM

## 2023-12-06 RX ADMIN — METOPROLOL SUCCINATE 100 MG: 100 TABLET, EXTENDED RELEASE ORAL at 08:33

## 2023-12-06 RX ADMIN — AMLODIPINE BESYLATE 10 MG: 10 TABLET ORAL at 08:33

## 2023-12-06 RX ADMIN — APIXABAN 5 MG: 5 TABLET, FILM COATED ORAL at 09:05

## 2023-12-06 RX ADMIN — CEFAZOLIN SODIUM 2000 MG: 100 INJECTION, POWDER, LYOPHILIZED, FOR SOLUTION INTRAVENOUS at 05:55

## 2023-12-06 RX ADMIN — ASPIRIN 81 MG: 81 TABLET, CHEWABLE ORAL at 08:33

## 2023-12-06 RX ADMIN — SULFAMETHOXAZOLE AND TRIMETHOPRIM 1 TABLET: 800; 160 TABLET ORAL at 11:36

## 2023-12-06 RX ADMIN — FUROSEMIDE 40 MG: 10 INJECTION, SOLUTION INTRAMUSCULAR; INTRAVENOUS at 09:05

## 2023-12-06 NOTE — PROGRESS NOTES
Cardiac Rehab: Spoke with patient regarding referral to cardiac rehab. Patient meets admission criteria based on TMVR(12/5/23). Written information about Cardiac Rehab given and reviewed with patient. Discussed lifestyle modifications to promote cardiac wellness. Pt states he did participate in Cardiac Rehab for a short time post MVR surgery in 2016. Patient indicated that he does not want to repeat the cardiac rehab program. Pt aware that with a [place] auth he could participate in Cardiac Rehab at no cost to him. He is aware that his current [place] authorization for TMVR does not include coverage for Cardiac Rehab. His Cardiologist is Dr. Felecia Dobson.       Thank you,  CHANG SinghN, RN  Cardiopulmonary Rehabilitation Nurse Liaison  [facility]

## 2023-12-06 NOTE — PLAN OF CARE
Problem: Chronic Conditions and Co-morbidities  Goal: Patient's chronic conditions and co-morbidity symptoms are monitored and maintained or improved  Outcome: Adequate for Discharge     Problem: Safety - Adult  Goal: Free from fall injury  Outcome: Adequate for Discharge     Problem: Pain  Goal: Verbalizes/displays adequate comfort level or baseline comfort level  Outcome: Adequate for Discharge     Problem: Discharge Planning  Goal: Discharge to home or other facility with appropriate resources  Outcome: Adequate for Discharge     Problem: ABCDS Injury Assessment  Goal: Absence of physical injury  Outcome: Adequate for Discharge

## 2023-12-06 NOTE — PROGRESS NOTES
Pt ambulated 50ft with no assistance, but was accompanied by nursing staff. Pt denied any SOB, chest pain, or palpitations. When pt arrived back to his room, his oxygen saturation was in the mid 80's on RA, pt placed on 2Lnc and O2 sat went up to 90%.

## 2023-12-06 NOTE — PROGRESS NOTES
Discharge instructions reviewed with patient and wife. Prescriptions given for eliquis and bactrim and med info sheets provided for all new medications. Opportunity for questions provided. Patient and wife voiced understanding of all discharge instructions. IV taken out and tele monitor returned to monitor room.

## 2023-12-06 NOTE — CARE COORDINATION
Pt presented to [facility] for TAVR procedure. At baseline, indep with his ADLs. Lives with his spouse. On RA. Denies DME need. PCP established. Seferino Jaquez verified and able to afford home meds. Pt indicated that he does not want to repeat the CRP at this time. No other discharge needs identified. Will remain available. 1458-Discharge order is in. Pt now discharging home in stable condition. No discharge needs identified. Tx goals met. 12/06/23 1357   Service Assessment   Patient Orientation Alert and Oriented   Cognition Alert   History Provided By Patient   Primary Caregiver Self   Support Systems Spouse/Significant Other;Children;Family Members;Friends/Neighbors   PCP Verified by CM Yes  (Lenore)   Prior Functional Level Independent in ADLs/IADLs   Current Functional Level Independent in ADLs/IADLs   Can patient return to prior living arrangement Yes   Ability to make needs known: Good   Family able to assist with home care needs: Yes   Would you like for me to discuss the discharge plan with any other family members/significant others, and if so, who? No   Financial Resources [place] ([place]   Community Resources None   Social/Functional History   Lives With Spouse   Type of [address] [facility]   Ambulation Assistance Independent   Active  Yes   Mode of Transportation Car   Occupation Retired   Services At/After Discharge   Transition [address]  ([address] [place] [address]) [address] Discharge None   [employer] Information Provided? Yes   Mode of Transport at Discharge Other (see comment)  (Family)   Confirm Follow Up Transport Family   Condition of Participation: Discharge Planning   The Patient and/or Patient Representative was provided with a Choice of Provider? Patient   The Patient and/Or Patient Representative agree with the Discharge Plan?  Yes   Freedom of Choice

## 2023-12-07 ENCOUNTER — TELEPHONE (OUTPATIENT)
Age: 73
End: 2023-12-07

## 2023-12-07 NOTE — TELEPHONE ENCOUNTER
Jeb Burnham has been contacted regarding recent hospital admission. Current medications were reviewed with the patient/caregiver by telephone. Date of Admission:  12/5/23     Date of Discharge: 12/6/23     Facility patient was discharged from: [facility], [place]     Reason for Admission: Mitral Valve Stenosis, severe   Any medication changes? Yes     How is the patient feeling? Pt is feeling \"outstanding\". Denies sob. More energy. Was able to walk the dogs, \"one lap\" yesterday and well tolerated. Bilateral groin sites clean and dry. No s/s of infection   Does the patient have any questions or problems? None at this time   Does the patient need transportation? Wife will transport   Follow-up Appointment date: 12/21/23 1:45p Dr. Fanta Arredondo, [address] ordered. Pt states has x3 dogs that need to be walked. He'd rather get his exercise that way. I advised the patient to bring his medications in the original bottles and to contact office, or go to either urgent care or emergency care if symptoms return before scheduled appointment. Review monitoring legs for temperature change, color change, numbness tingling. To ER for urgent eval. Advise abx prophylaxis prior to any dental work. Encourage return call, prn. Pt voiced understanding and agreement with POC.   Yelitza Lozano LPN [id number]/0/3862 6:33 AM

## 2023-12-08 ENCOUNTER — TELEPHONE (OUTPATIENT)
Dept: CARDIAC CATH/INVASIVE PROCEDURES | Age: 73
End: 2023-12-08

## 2023-12-08 NOTE — TELEPHONE ENCOUNTER
Spoke to pt for follow up after discharging home s/p TMVR. Pt states groin sites are without redness, pain, or warm to touch. Dressing to groin sites bilaterally have been removed, no bleeding noted. Encouraged to contact me or the office with any pain, redness, or s/sx of infection and arrangements will be made for pt to be seen in the office. Pt is ambulating and SOB has decreased and state he feels \"much better than before\". Pt is taking eliquis BID, antibiotics, and lasix along with other meds as prescribed. Discussed upcoming appts for discharge follow up and labs need to be drawn next week prio to his T7 visit on 12/21. Orders for labs are in the computer, no appt required, and non fasting. Contact information provided, no further complaints/concerns voiced and questions answered.     Ana Nelson, Structural Heart Navigator

## 2023-12-09 LAB
ABO + RH BLD: NORMAL
BLD PROD TYP BPU: NORMAL
BLOOD BANK DISPENSE STATUS: NORMAL
BLOOD GROUP ANTIBODIES SERPL: NORMAL
BPU ID: NORMAL
CROSSMATCH RESULT: NORMAL
SPECIMEN EXP DATE BLD: NORMAL
UNIT DIVISION: 0

## 2023-12-11 ENCOUNTER — TELEPHONE (OUTPATIENT)
Age: 73
End: 2023-12-11

## 2023-12-11 NOTE — TELEPHONE ENCOUNTER
MEDICATION REFILL REQUEST      Name of Medication:  eliquis   Dose:  5 mg  Frequency:  twie a day   Quantity:    Days' supply:  90      [facility] call when called in

## 2023-12-11 NOTE — TELEPHONE ENCOUNTER
[facility]  Requested Prescriptions     Signed Prescriptions Disp Refills    apixaban (ELIQUIS) 5 MG TABS tablet 180 tablet 1     Sig: Take 1 tablet by mouth 2 times daily     Authorizing Provider: Chelsey Talavera     Ordering User: Hari Subramanian

## 2023-12-15 DIAGNOSIS — R06.02 SHORTNESS OF BREATH: ICD-10-CM

## 2023-12-15 DIAGNOSIS — Z95.2 S/P TRANSCATHETER MITRAL VALVE REPLACEMENT (TMVR): ICD-10-CM

## 2023-12-15 DIAGNOSIS — I50.22 SYSTOLIC CHF, CHRONIC (HCC): ICD-10-CM

## 2023-12-15 LAB
ANION GAP SERPL CALC-SCNC: 7 MMOL/L (ref 2–11)
BUN SERPL-MCNC: 19 MG/DL (ref 8–23)
CALCIUM SERPL-MCNC: 9.2 MG/DL (ref 8.3–10.4)
CHLORIDE SERPL-SCNC: 108 MMOL/L (ref 103–113)
CO2 SERPL-SCNC: 23 MMOL/L (ref 21–32)
CREAT SERPL-MCNC: 1 MG/DL (ref 0.8–1.5)
GLUCOSE SERPL-MCNC: 117 MG/DL (ref 65–100)
MAGNESIUM SERPL-MCNC: 2.1 MG/DL (ref 1.8–2.4)
POTASSIUM SERPL-SCNC: 4.4 MMOL/L (ref 3.5–5.1)
SODIUM SERPL-SCNC: 138 MMOL/L (ref 136–146)

## 2024-01-05 PROBLEM — N39.0 UTI (URINARY TRACT INFECTION): Status: RESOLVED | Noted: 2023-12-06 | Resolved: 2024-01-05

## 2024-01-15 DIAGNOSIS — Z95.2 S/P TRANSCATHETER MITRAL VALVE REPLACEMENT (TMVR): Primary | ICD-10-CM

## 2024-01-25 ENCOUNTER — OFFICE VISIT (OUTPATIENT)
Age: 74
End: 2024-01-25

## 2024-01-25 VITALS
SYSTOLIC BLOOD PRESSURE: 138 MMHG | DIASTOLIC BLOOD PRESSURE: 80 MMHG | HEART RATE: 81 BPM | HEIGHT: 71 IN | WEIGHT: 228.2 LBS | BODY MASS INDEX: 31.95 KG/M2

## 2024-01-25 DIAGNOSIS — Z95.2 S/P TRANSCATHETER MITRAL VALVE REPLACEMENT (TMVR): Primary | ICD-10-CM

## 2024-01-25 DIAGNOSIS — I10 ESSENTIAL HYPERTENSION, BENIGN: ICD-10-CM

## 2024-01-25 DIAGNOSIS — I48.0 PAROXYSMAL ATRIAL FIBRILLATION (HCC): ICD-10-CM

## 2024-01-25 ASSESSMENT — KANSAS CITY CARDIOMYOPATHY QUESTIONNAIRE (KCCQ12)
4. OVER THE PAST 2 WEEKS, ON AVERAGE, HOW MANY TIMES HAS SHORTNESS OF BREATH LIMITED YOUR ABILITY TO DO WHAT YOU WANTED: 7
2. OVER THE PAST 2 WEEKS, HOW MANY TIMES DID YOU HAVE SWELLING IN YOUR FEET, ANKLES OR LEGS WHEN YOU WOKE UP IN THE MORNING: 5
TOTAL SCORE: 64
7. IF YOU HAD TO SPEND THE REST OF YOUR LIFE WITH YOUR HEART FAILURE THE WAY IT IS RIGHT NOW, HOW WOULD YOU FEEL ABOUT THIS?: 5
5. OVER THE PAST 2 WEEKS, ON AVERAGE, HOW MANY TIMES HAVE YOU BEEN FORCED TO SLEEP SITTING UP IN A CHAIR OR WITH AT LEAST 3 PILLOWS TO PROP YOU UP BECAUSE OF SHORTNESS OF BREATH?: 5
8B. OVER THE PAST 2 WEEKS, ON AVERAGE, HOW HAS HEART FAILURE LIMITED YOU ABILITY TO WORK OR DO HOUSEHOLD CHORES: 5
8A. OVER THE PAST 2 WEEKS, ON AVERAGE, HOW HAS HEART FAILURE LIMITED YOU ABILITY TO DO HOBBIES OR RECREATIONAL ACTIVITIES: 5
1A. OVER THE PAST 2 WEEKS, HOW MUCH WERE YOU LIMITED BY HEART FAILURE SYMPTOMS (SHORTNESS OF BREATH OR FATIGUE) WHEN SHOWERING OR BATHING: 5
6. OVER THE PAST 2 WEEKS, HOW MUCH HAS YOUR HEART FAILURE LIMITED YOUR ENJOYMENT OF LIFE: 5
3. OVER THE PAST 2 WEEKS, ON AVERAGE, HOW MANY TIMES HAS FATIGUE LIMITED YOUR ABILITY TO DO WHAT YOU WANTED: 7
8C. OVER THE PAST 2 WEEKS, ON AVERAGE, HOW HAS HEART FAILURE LIMITED YOU ABILITY TO VISIT FAMILY AND FRIENDS OUR OF YOUR HOME: 5
1C. OVER THE PAST 2 WEEKS, HOW MUCH WERE YOU LIMITED BY HEART FAILURE SYMPTOMS (SHORTNESS OF BREATH OR FATIGUE) WHEN HURRYING OR JOGGING (AS IF TO CATCH A BUS): 5
1B. OVER THE PAST 2 WEEKS, HOW MUCH WERE YOU LIMITED BY HEART FAILURE SYMPTOMS (SHORTNESS OF BREATH OR FATIGUE) WHEN WALKING 1 BLOCK ON LEVEL GROUND: 5

## 2024-01-25 ASSESSMENT — ENCOUNTER SYMPTOMS: SHORTNESS OF BREATH: 0

## 2024-01-25 NOTE — PROGRESS NOTES
[address], [place] [address]  PHONE: [phone]    Sammy Payne  1950      SUBJECTIVE:   Sammy Payne is a 73 y.o. male seen for a follow up visit regarding the following:     No chief complaint on file.      HPI:    Patient presents for follow-up.  He underwent transcatheter mitral valve replacement as outlined below:    TMVR (12/5/23): Successful transcatheter mitral valve in valve replacement utilizing a 29 mm Castelan YOLIS S3 valve via transseptal approach.      He has done well.  Dyspnea much improved.   Exercising at home.  NO PND, orthopnea or edema.     His echo today shows:  01/25/24    ECHO (TTE) COMPLETE (PRN CONTRAST/BUBBLE/STRAIN/3D) 01/25/2024  9:17 AM (Final)    Interpretation Summary    Image quality is adequate.    Left Ventricle: Normal left ventricular systolic function. EF by 2D Simpsons Biplane is 55%. Left ventricle size is normal. Increased wall thickness. Normal wall motion.    Right Ventricle: Right ventricle is mildly dilated. Low normal systolic function.    Mitral Valve: Transcatheter bioprosthetic valve. Normal function. No regurgitation. No stenosis noted. MV mean gradient is 5 mmHg. MV PHT is 124.0 ms.    Tricuspid Valve: Repaired by annular ring. Trace regurgitation. TV mean gradient is 3 mmHg. .32mm Isamar annuloplasty ring, 2015 The estimated RVSP is 45 mmHg.    Left Atrium: Left atrium is moderately dilated. LA Vol Index is  42 ml/m2.    Right Atrium: Right atrium is moderately dilated.    Signed by: Elkin Tomlin MD on 1/25/2024  9:17 AM      Declined cardiac rehab.         Primary cardiologist: Nneka.  He has an appointment scheduled April 5, 2024    Past Medical History, Past Surgical History, Family history, Social History, and Medications were all reviewed with the patient today and updated as necessary.           Current Outpatient Medications:     apixaban (ELIQUIS) 5 MG TABS tablet, Take 1 tablet by 
cardiac catheterization

## 2024-04-04 NOTE — PROGRESS NOTES
MITRAL VALVE MAZE PROCEDURE        No family history on file.   Social History     Tobacco Use    Smoking status: Never    Smokeless tobacco: Never   Substance Use Topics    Alcohol use: Not Currently      No Known Allergies      ROS:  No obvious pertinent positives on review of systems except for what was outlined above.       Objective:       /78   Pulse 100   Ht 1.803 m (5' 11\")   Wt 105.7 kg (233 lb)   BMI 32.50 kg/m²     BP Readings from Last 3 Encounters:   04/05/24 128/78   01/25/24 138/80   01/25/24 120/76       Wt Readings from Last 3 Encounters:   04/05/24 105.7 kg (233 lb)   01/25/24 103.5 kg (228 lb 3.2 oz)   01/25/24 101.2 kg (223 lb)       General/Constitutional:   Alert and oriented x 3, no acute distress  HEENT:   normocephalic, atraumatic, moist mucous membranes  Neck:   No JVD or carotid bruits bilaterally  Cardiovascular:   regular rate and rhythm, no rub/gallop appreciated  Pulmonary:   clear to auscultation bilaterally, no respiratory distress  Abdomen:   soft, non-tender, non-distended  Ext:   No sig LE edema bilaterally  Skin:  warm and dry, no obvious rashes seen  Neuro:   no obvious sensory or motor deficits  Psychiatric:   normal mood and affect    ECG:   Sinus rhythm  PACs  Nonspecific ST and/or T wave abnormalities  Heart rate 91 bpm    Data Review:   No results found for: \"CHOL\"  No results found for: \"TRIG\"  No results found for: \"HDL\"  No results found for: \"LDLCHOLESTEROL\", \"LDLCALC\"  No results found for: \"VLDL\"  No results found for: \"CHOLHDLRATIO\"     Lab Results   Component Value Date/Time     12/15/2023 09:18 AM     12/06/2023 03:52 AM     12/04/2023 08:24 AM     12/04/2023 08:24 AM    K 4.4 12/15/2023 09:18 AM    K 4.0 12/06/2023 03:52 AM    K 3.7 12/04/2023 08:24 AM    K 3.7 12/04/2023 08:24 AM     12/15/2023 09:18 AM     12/06/2023 03:52 AM     12/04/2023 08:24 AM     12/04/2023 08:24 AM    CO2 23 12/15/2023 09:18 AM

## 2024-04-05 ENCOUNTER — OFFICE VISIT (OUTPATIENT)
Age: 74
End: 2024-04-05
Payer: OTHER GOVERNMENT

## 2024-04-05 VITALS
WEIGHT: 233 LBS | BODY MASS INDEX: 32.62 KG/M2 | DIASTOLIC BLOOD PRESSURE: 78 MMHG | HEIGHT: 71 IN | HEART RATE: 100 BPM | SYSTOLIC BLOOD PRESSURE: 128 MMHG

## 2024-04-05 DIAGNOSIS — Z86.79 HISTORY OF ATRIAL FIBRILLATION: ICD-10-CM

## 2024-04-05 DIAGNOSIS — I50.32 CHRONIC HEART FAILURE WITH PRESERVED EJECTION FRACTION (HFPEF) (HCC): ICD-10-CM

## 2024-04-05 DIAGNOSIS — E78.5 HYPERLIPIDEMIA, UNSPECIFIED HYPERLIPIDEMIA TYPE: Primary | ICD-10-CM

## 2024-04-05 DIAGNOSIS — Z95.2 STATUS POST TRANSCATHETER MITRAL VALVE REPLACEMENT: ICD-10-CM

## 2024-04-05 DIAGNOSIS — I10 HYPERTENSION, UNSPECIFIED TYPE: ICD-10-CM

## 2024-04-05 DIAGNOSIS — Z98.890 HISTORY OF TRICUSPID VALVE REPAIR: ICD-10-CM

## 2024-04-05 PROCEDURE — 93000 ELECTROCARDIOGRAM COMPLETE: CPT | Performed by: INTERNAL MEDICINE

## 2024-04-05 PROCEDURE — 1123F ACP DISCUSS/DSCN MKR DOCD: CPT | Performed by: INTERNAL MEDICINE

## 2024-04-05 PROCEDURE — 99214 OFFICE O/P EST MOD 30 MIN: CPT | Performed by: INTERNAL MEDICINE

## 2024-04-05 PROCEDURE — 3078F DIAST BP <80 MM HG: CPT | Performed by: INTERNAL MEDICINE

## 2024-04-05 PROCEDURE — 3074F SYST BP LT 130 MM HG: CPT | Performed by: INTERNAL MEDICINE

## 2024-04-05 RX ORDER — ASPIRIN 81 MG/1
81 TABLET, CHEWABLE ORAL DAILY
COMMUNITY
Start: 2021-02-19 | End: 2024-04-05

## 2024-04-05 RX ORDER — METOPROLOL SUCCINATE 200 MG/1
200 TABLET, EXTENDED RELEASE ORAL DAILY
COMMUNITY

## 2025-02-17 ENCOUNTER — TELEPHONE (OUTPATIENT)
Age: 75
End: 2025-02-17

## 2025-02-17 ASSESSMENT — KANSAS CITY CARDIOMYOPATHY QUESTIONNAIRE (KCCQ12)
6. OVER THE PAST 2 WEEKS, HOW MUCH HAS YOUR HEART FAILURE LIMITED YOUR ENJOYMENT OF LIFE: IT HAS NOT LIMITED MY ENJOYMENT OF LIFE AT ALL
5. OVER THE PAST 2 WEEKS, ON AVERAGE, HOW MANY TIMES HAVE YOU BEEN FORCED TO SLEEP SITTING UP IN A CHAIR OR WITH AT LEAST 3 PILLOWS TO PROP YOU UP BECAUSE OF SHORTNESS OF BREATH?: NEVER OVER THE PAST 2 WEEKS
1A. OVER THE PAST 2 WEEKS, HOW MUCH WERE YOU LIMITED BY HEART FAILURE SYMPTOMS (SHORTNESS OF BREATH OR FATIGUE) WHEN SHOWERING OR BATHING: NOT AT ALL LIMITED
1C. OVER THE PAST 2 WEEKS, HOW MUCH WERE YOU LIMITED BY HEART FAILURE SYMPTOMS (SHORTNESS OF BREATH OR FATIGUE) WHEN HURRYING OR JOGGING (AS IF TO CATCH A BUS): NOT AT ALL LIMITED
8C. OVER THE PAST 2 WEEKS, ON AVERAGE, HOW HAS HEART FAILURE LIMITED YOU ABILITY TO VISIT FAMILY AND FRIENDS OUR OF YOUR HOME: DID NOT LIMIT AT ALL
8B. OVER THE PAST 2 WEEKS, ON AVERAGE, HOW HAS HEART FAILURE LIMITED YOU ABILITY TO WORK OR DO HOUSEHOLD CHORES: DID NOT LIMIT AT ALL
3. OVER THE PAST 2 WEEKS, ON AVERAGE, HOW MANY TIMES HAS FATIGUE LIMITED YOUR ABILITY TO DO WHAT YOU WANTED: NEVER OVER THE PAST 2 WEEKS
1B. OVER THE PAST 2 WEEKS, HOW MUCH WERE YOU LIMITED BY HEART FAILURE SYMPTOMS (SHORTNESS OF BREATH OR FATIGUE) WHEN WALKING 1 BLOCK ON LEVEL GROUND: NOT AT ALL LIMITED
2. OVER THE PAST 2 WEEKS, HOW MANY TIMES DID YOU HAVE SWELLING IN YOUR FEET, ANKLES OR LEGS WHEN YOU WOKE UP IN THE MORNING: NEVER OVER THE PAST 2 WEEKS
8A. OVER THE PAST 2 WEEKS, ON AVERAGE, HOW HAS HEART FAILURE LIMITED YOU ABILITY TO DO HOBBIES OR RECREATIONAL ACTIVITIES: DID NOT LIMIT AT ALL
4. OVER THE PAST 2 WEEKS, ON AVERAGE, HOW MANY TIMES HAS SHORTNESS OF BREATH LIMITED YOUR ABILITY TO DO WHAT YOU WANTED: NEVER OVER THE PAST 2 WEEKS
7. IF YOU HAD TO SPEND THE REST OF YOUR LIFE WITH YOUR HEART FAILURE THE WAY IT IS RIGHT NOW, HOW WOULD YOU FEEL ABOUT THIS?: COMPLETELY SATISFIED

## 2025-02-17 NOTE — TELEPHONE ENCOUNTER
Spoke to spouse who states pt is feeling better since his procedure and [facility]. Pt had an echo completed on 1/7 and pulled from Care everywhere to provide information for the TVT registry. Pt ambulating well and continues his eliquis.     Keri

## (undated) DEVICE — SHEATH INTRO L12CM DIA6FR W/ LUERLOCK HUB HEMSTAS VLV

## (undated) DEVICE — GUIDEWIRE VASC L150CM DIA0.025IN TIP L7CM J RAD 3MM PTFE

## (undated) DEVICE — INTRO SHEATH TRANSSEPTAL 8.5FRX71CM

## (undated) DEVICE — GUIDEWIRE VASC L260CM DIA0.035IN BRECKER FOR COREVLV

## (undated) DEVICE — GLIDESHEATH SLENDER STAINLESS STEEL KIT: Brand: GLIDESHEATH SLENDER

## (undated) DEVICE — THIN-FLEX DUAL STAGE VENOUS: Brand: THIN-FLEX DUAL STAGE VENOUS DRAINAGE CANNULA

## (undated) DEVICE — Device

## (undated) DEVICE — COVER,TABLE,HEAVY DUTY,79"X110",STRL: Brand: MEDLINE

## (undated) DEVICE — PINNACLE TIF INTRODUCER SHEATH: Brand: PINNACLE

## (undated) DEVICE — CATHETER DIAG AD 6FR L110CM 0.038IN COR POLYUR PGTL W/ 6

## (undated) DEVICE — 12 FOOT DISPOSABLE EXTENSION CABLE WITH SAFE CONNECT / SCREW-DOWN

## (undated) DEVICE — Device: Brand: PROTRACK PIGTAIL WIRE

## (undated) DEVICE — CATHETER DIAG 5FR L100CM LUMN ID0.047IN JL3.5 CRV 0 SIDE H

## (undated) DEVICE — CATHETER DIAG 6FR L100CM GWIRE 0.038IN S STL POLYUR MP W/ 2

## (undated) DEVICE — BAND COMPR L24CM REG CLR PLAS HEMSTAT EXT HK AND LOOP RETEN

## (undated) DEVICE — CATHETER DIAG AD 5FR L110CM 145DEG COR GRY HYDRPHLC NYL

## (undated) DEVICE — DEVICE COMPR LNG 27 CM VASC BND

## (undated) DEVICE — GUIDEWIRE VASC L260CM DIA0.035IN TAPR L11CM FLPY TIP L4CM

## (undated) DEVICE — CATHETER THRMDIL 7FR L110CM STD PULM ART 4 INFUS LUMN SWN

## (undated) DEVICE — PINNACLE INTRODUCER SHEATH: Brand: PINNACLE

## (undated) DEVICE — GUIDEWIRE WITH ICE™ HYDROPHILIC COATING: Brand: CHOICE™

## (undated) DEVICE — INTRODUCER TRANSSEPTAL GUID 8.5FR L63CM DIL 8.5FR L67CM

## (undated) DEVICE — ARMADA 35 PTA CATHETER 14 MM X 40 MM X 135 CM / OVER-THE-WIRE: Brand: ARMADA

## (undated) DEVICE — PERCLOSE™ PROSTYLE™ SUTURE-MEDIATED CLOSURE AND REPAIR SYSTEM: Brand: PERCLOSE™ PROSTYLE™

## (undated) DEVICE — COVER US PRB W15XL120CM W/ GEL RUBBERBAND TAPE STRP FLD GEN

## (undated) DEVICE — GUIDE NDL ST W/ 14 X 147CM TELESCOPICALLY FLD CIV FLX CVR

## (undated) DEVICE — GUIDEWIRE VASC L260CM DIA0.035IN RAD 3MM J TIP L7CM PTFE

## (undated) DEVICE — Device: Brand: NRG TRANSSEPTAL NEEDLE

## (undated) DEVICE — NO DESCRIPTION: Brand: SENTINEL

## (undated) DEVICE — CATHETER COR DIAG JUDKINS R 5.0 CRV 5FR 100CM 0 SIDE H